# Patient Record
Sex: FEMALE | Race: WHITE | HISPANIC OR LATINO | Employment: UNEMPLOYED | URBAN - METROPOLITAN AREA
[De-identification: names, ages, dates, MRNs, and addresses within clinical notes are randomized per-mention and may not be internally consistent; named-entity substitution may affect disease eponyms.]

---

## 2018-03-19 ENCOUNTER — NON-PROVIDER VISIT (OUTPATIENT)
Dept: OBGYN | Facility: CLINIC | Age: 33
End: 2018-03-19

## 2018-03-19 DIAGNOSIS — Z32.01 POSITIVE URINE PREGNANCY TEST: ICD-10-CM

## 2018-03-19 LAB
INT CON NEG: NEGATIVE
INT CON POS: POSITIVE
POC URINE PREGNANCY TEST: POSITIVE

## 2018-03-19 PROCEDURE — 81025 URINE PREGNANCY TEST: CPT | Performed by: OBSTETRICS & GYNECOLOGY

## 2018-03-26 ENCOUNTER — APPOINTMENT (OUTPATIENT)
Dept: OBGYN | Facility: CLINIC | Age: 33
End: 2018-03-26

## 2018-04-30 ENCOUNTER — INITIAL PRENATAL (OUTPATIENT)
Dept: OBGYN | Facility: CLINIC | Age: 33
End: 2018-04-30

## 2018-04-30 ENCOUNTER — HOSPITAL ENCOUNTER (OUTPATIENT)
Facility: MEDICAL CENTER | Age: 33
End: 2018-04-30
Attending: NURSE PRACTITIONER
Payer: COMMERCIAL

## 2018-04-30 VITALS
SYSTOLIC BLOOD PRESSURE: 134 MMHG | WEIGHT: 208 LBS | HEIGHT: 61 IN | BODY MASS INDEX: 39.27 KG/M2 | DIASTOLIC BLOOD PRESSURE: 80 MMHG

## 2018-04-30 DIAGNOSIS — Z34.82 ENCOUNTER FOR SUPERVISION OF OTHER NORMAL PREGNANCY, SECOND TRIMESTER: ICD-10-CM

## 2018-04-30 LAB
APPEARANCE UR: NORMAL
BILIRUB UR STRIP-MCNC: NORMAL MG/DL
COLOR UR AUTO: NORMAL
GLUCOSE UR STRIP.AUTO-MCNC: NEGATIVE MG/DL
KETONES UR STRIP.AUTO-MCNC: NEGATIVE MG/DL
LEUKOCYTE ESTERASE UR QL STRIP.AUTO: NORMAL
NITRITE UR QL STRIP.AUTO: NEGATIVE
PH UR STRIP.AUTO: 7.5 [PH] (ref 5–8)
PROT UR QL STRIP: NEGATIVE MG/DL
RBC UR QL AUTO: NEGATIVE
SP GR UR STRIP.AUTO: 1.02
UROBILINOGEN UR STRIP-MCNC: NORMAL MG/DL

## 2018-04-30 PROCEDURE — 81002 URINALYSIS NONAUTO W/O SCOPE: CPT | Performed by: NURSE PRACTITIONER

## 2018-04-30 PROCEDURE — 59401 PR NEW OB VISIT: CPT | Performed by: NURSE PRACTITIONER

## 2018-04-30 NOTE — PROGRESS NOTES
Pt. Here for NOB visit today.  # [phone]  First prenatal care  Pt. States having nausea.   Pharmacy verified.   AFP offered but declines refusal form signed and scanned.

## 2018-04-30 NOTE — PROGRESS NOTES
Subjective:   Mary Santana is a 33 y.o.  who presents for her new OB exam.  She is 22w2d with an RUSS of Estimated Date of Delivery: 18 by LMP she is sure of but sometimes that comes twice in a month. She is feeling well and has no concerns at this time. Denies VB, LOF, contractions or pain. No ER visits or previous care in this pregnancy. Denies dysuria, vaginal DC, fever. Reports fetal movement. Declines AFP.  Declines CF.  Reports being diagnosed with high BP but then she stopped taking her med like a year ago.     Past Medical History:   Diagnosis Date   • Cholelithiasis 2010   • Head ache    • Hypertension 2015    Pt states was taking Lisinopril 10 mg but stopped taking on her own more than a year ago.        Psych Hx: Patient denies any history of depression, anxiety, PTSD, bipolar or any other psychological issues.     Past Surgical History:   Procedure Laterality Date   • DILATION AND CURETTAGE  ,    due to SAB        OB History    Para Term  AB Living   6 3 3   2 3   SAB TAB Ectopic Molar Multiple Live Births   2         3      # Outcome Date GA Lbr Akshat/2nd Weight Sex Delivery Anes PTL Lv   6 Current            5 Term 11/28/10 40w0d  3.175 kg (7 lb) F Vag-Spont None N DEVIN      Birth Comments: Pt states was induced, due to gallbladder pains.    4 Term 09 40w0d  3.374 kg (7 lb 7 oz) F Vag-Spont None  DEVIN   3 SAB 07 11w0d    SAB         Birth Comments: D&C done 1 day procedure   2 Term 05 41w0d  3.969 kg (8 lb 12 oz) M Vag-Spont None  DEVIN   1 SAB  11w0d    SAB  N       Birth Comments: d&c  done 1 day procedure , nop cmplications           Gynecological Hx: Denies any hx of STIs, including HSV. Denies any vulvovaginal disorders and no hx of abnormal cervical cytology. Last pap negatives    Sexual Hx: One current male partner, who is FOB     Contraceptive Hx: Has not used in the past and has since discontinued use.     Family History   Problem  "Relation Age of Onset   • Alcohol/Drug Father      alcohol     Denies any genetic disorders in family history.     Social History     Social History   • Marital status: Single     Spouse name: N/A   • Number of children: N/A   • Years of education: N/A     Occupational History   • Not on file.     Social History Main Topics   • Smoking status: Never Smoker   • Smokeless tobacco: Never Used   • Alcohol use No   • Drug use: No   • Sexual activity: Yes     Partners: Male      Comment: none     Other Topics Concern   • Not on file     Social History Narrative   • No narrative on file       FOB is involved and lives with Mary Santana.  Pregnancy is unplanned but desired.    She is currently working at [employer], denies any heavy lifting or exposure to potential teratogens like environmental or occupational toxins.   Denies alcohol use, drug use, or tobacco use in pregnancy.   Denies any current or hx of sexual, emotional or physical abuse or trauma.     Current Medications: PNV   Allergies: Denies allergies to medications, food, or environmental allergies    Objective:      Vitals:    04/30/18 1046   BP: 134/80   Weight: 94.3 kg (208 lb)   Height: 1.549 m (5' 1\")        Prenatal Physical wnl and Prenatal Vitals documented  UA WNL today      Assessment:      1.  IUP @ 22w2d per LMP      2.  S=D      3.  See problem list as follows     There are no active problems to display for this patient.        Plan:   -  GC/CT & pap done today   - Prenatal labs ordered - lab slip given  - Discussed PNV, nutrition, adequate water intake, and exercise/weight gain in pregnancy  - NOB informational packet with anticipatory guidance given  - Information on Centering Pregnancy given, pt not appropriate  - S/sx of pregnancy warning signs and PTL precautions given  - Complete OB US in next available wks  - Return to [facility] in 4 wks  "

## 2018-04-30 NOTE — LETTER
Estudios Para Detectar los Portadores de la Fibrosis Quística   (La Enfermedad Fibroquística del Páncreas)    Mary Santana Santana    Esta información esta relacionada con un examen de juan carlos que puede determinar si usted o rodriguez anel es portador del gen que causa la enfermedad hereditaria que se llama la fibrosis quística.     ¿QUE ES LA ENFERMEDAD FIBROSIS QUÍSTICA?  · La  fibrosis quística es kelsea enfermedad hereditaria que afecta a más de 25,000 niños y jóvenes adultos americanos.  · Los síntomas de la fibrosis quística varían de kelsea persona a otra.  Los síntomas más comunes son congestión pulmonar, diarrea y retraso en el crecimiento del tory.  La mayoría de las personas con la fibrosis quística padecen de problemas médicos muy severos, y algunas mueren jóvenes.  Otras tienen tan pocos síntomas que no se percatan de que tienen fibrosis quística.  · La fibrosis quística no afecta en absoluto la inteligencia de la persona.  · Aunque actualmente no hay kelsea lucille para la fibrosis quística, los científicos están avanzando con respecto a nuevos tratamientos y en la búsqueda de la lucille.  Anteriormente la mayoría de las personas que padecían de fibrosis quística morían muy jóvenes.  Hoy en día, muchas personas que padecen de la fibrosis quística sobreviven hasta los 20 o 30 anos de edad.    ¿EXISTE LA POSIBILIDAD DE QUE MI TIANNA PUEDA TENER FIBROSIS QUÍSTICA?  · Usted puede tener un hijo con la fibrosis quística aun cuando no hay nadie en rodriguez biju que la padezca.  (Rylie la grafica que sigue.)  · Existe kelsea prueba que puede ayudarle a determinar si warren un gen para la fibrosis quística y si por eso corre un riesgo de tener un hijo con la enfermedad.  · Si ambos padres son portadores del gen para la fibrosis quística, hay kelsea (1) probabilidad entre 4 (25%) en cada embarazo, de que puedan tener un hijo afectada con fibrosis quística.  · Los portadores del gen para la fibrosis quística tienen kelsea copia del gen  normal y el otro gen alterado.  · Las personas con fibrosis quística tienen dos genes alterados para la fibrosis quística,  · Normalmente la mayoría de individuos tienen dos copias normales del gen para fibrosis quística.    Riesgo aproximado de que kelsea anel sin familiares con fibrosis quística pueda tener un hijo con fibrosis quística:  Origen / Riesgo  Kelsea anel Caucásica (de charanjit janna):  1 en 2,500  Kelsea anel :  1 en 8,000  Kelsea anel Afroamericana (de charanjit cayetano):  1 en 15,000  Kelsea anel Asiática:  1 en 32,000    ¿EXISTEN PRUEBAS PARA DETECTAR LOS PORTADORES DE LA FIBROSIS QUÍSTICA?  · Hay kelsea prueba de juan carlos para determinar si usted o rodriguez anel portan el gen para la fibrosis quística.  · Es importante entender que la prueba no detecta todos los portadores del gen para la fibrosis quística.  · Si la prueba determina que ambos padres con portadores, se puede realizar otras pruebas para determinar si rodriguez futuro bandar esta afectado.    ¿CUANTO YOSELIN LA PRUEBA PARA DETERMINAR SI SOY PORTADOR(A) DEL GEN PARA LA FIBROSIS QUÍSTICA?  · El costo de la prueba varia según rodriguez póliza de seguro medico y el laboratorio donde se efectúa el análisis.  · El costo promedio de la prueba es de $300.  · Rodriguez consejera genética puede darle mas información acera de esta prueba para determinar si usted es portador de la fibrosis quística.    _____  Si, yo estoy interesada y me gustaria obtener mas información al respecto.  _____  No tengo interés ni en hacerme la prueba para determinar si soy portador(a) de gen para la fibrosis quística ni en recibir mas información al respecto.    Firma del paciente: _____________________________   4/30/2018

## 2018-04-30 NOTE — PATIENT INSTRUCTIONS
-  GC/CT & pap done today   - Prenatal labs ordered - lab slip given  - Discussed PNV, nutrition, adequate water intake, and exercise/weight gain in pregnancy  - NOB informational packet with anticipatory guidance given  - Information on Centering Pregnancy given, pt not appropriate  - S/sx of pregnancy warning signs and PTL precautions given  - Complete OB US in next available wks  - Return to [facility] in 4 wks

## 2018-05-03 ENCOUNTER — DATING (OUTPATIENT)
Dept: OBGYN | Facility: CLINIC | Age: 33
End: 2018-05-03

## 2018-05-03 ENCOUNTER — APPOINTMENT (OUTPATIENT)
Dept: RADIOLOGY | Facility: IMAGING CENTER | Age: 33
End: 2018-05-03
Attending: NURSE PRACTITIONER

## 2018-05-03 DIAGNOSIS — Z34.82 ENCOUNTER FOR SUPERVISION OF OTHER NORMAL PREGNANCY, SECOND TRIMESTER: ICD-10-CM

## 2018-05-03 LAB
C TRACH DNA GENITAL QL NAA+PROBE: NEGATIVE
CYTOLOGY REG CYTOL: NORMAL
N GONORRHOEA DNA GENITAL QL NAA+PROBE: NEGATIVE
SPECIMEN SOURCE: NORMAL

## 2018-05-03 PROCEDURE — 76805 OB US >/= 14 WKS SNGL FETUS: CPT | Mod: 26 | Performed by: OBSTETRICS & GYNECOLOGY

## 2018-05-08 ENCOUNTER — TELEPHONE (OUTPATIENT)
Dept: OBGYN | Facility: CLINIC | Age: 33
End: 2018-05-08

## 2018-05-08 NOTE — TELEPHONE ENCOUNTER
Notes recorded by Jacque Kemp M.D. on 5/3/2018 at 1:37 PM PDT  Ultrasound normal, probable 9 cm dermoid cyst left ovary please have patient follow-up with M.D. for consultation regarding dermoid cyst    Pt notified and re scheduled w/MD on 5/29/18 @ 1000.  Verbalized understanding.

## 2018-05-29 ENCOUNTER — ROUTINE PRENATAL (OUTPATIENT)
Dept: OBGYN | Facility: CLINIC | Age: 33
End: 2018-05-29

## 2018-05-29 ENCOUNTER — HOSPITAL ENCOUNTER (OUTPATIENT)
Dept: LAB | Facility: MEDICAL CENTER | Age: 33
End: 2018-05-29
Attending: OBSTETRICS & GYNECOLOGY
Payer: COMMERCIAL

## 2018-05-29 ENCOUNTER — HOSPITAL ENCOUNTER (OUTPATIENT)
Dept: LAB | Facility: MEDICAL CENTER | Age: 33
End: 2018-05-29
Attending: NURSE PRACTITIONER
Payer: COMMERCIAL

## 2018-05-29 VITALS — SYSTOLIC BLOOD PRESSURE: 120 MMHG | WEIGHT: 209 LBS | BODY MASS INDEX: 39.49 KG/M2 | DIASTOLIC BLOOD PRESSURE: 64 MMHG

## 2018-05-29 DIAGNOSIS — Z34.82 ENCOUNTER FOR SUPERVISION OF OTHER NORMAL PREGNANCY, SECOND TRIMESTER: ICD-10-CM

## 2018-05-29 DIAGNOSIS — N83.8 OVARIAN MASS, LEFT: ICD-10-CM

## 2018-05-29 DIAGNOSIS — O40.2XX0 POLYHYDRAMNIOS IN SECOND TRIMESTER, SINGLE OR UNSPECIFIED FETUS: ICD-10-CM

## 2018-05-29 LAB
ABO GROUP BLD: NORMAL
ALBUMIN SERPL BCP-MCNC: 3.4 G/DL (ref 3.2–4.9)
ALBUMIN/GLOB SERPL: 1 G/DL
ALP SERPL-CCNC: 75 U/L (ref 30–99)
ALT SERPL-CCNC: 8 U/L (ref 2–50)
ANION GAP SERPL CALC-SCNC: 5 MMOL/L (ref 0–11.9)
APPEARANCE UR: CLEAR
AST SERPL-CCNC: 12 U/L (ref 12–45)
BASOPHILS # BLD AUTO: 0.8 % (ref 0–1.8)
BASOPHILS # BLD: 0.08 K/UL (ref 0–0.12)
BILIRUB SERPL-MCNC: 0.3 MG/DL (ref 0.1–1.5)
BILIRUB UR QL STRIP.AUTO: NEGATIVE
BLD GP AB SCN SERPL QL: NORMAL
BUN SERPL-MCNC: 7 MG/DL (ref 8–22)
CALCIUM SERPL-MCNC: 8.6 MG/DL (ref 8.5–10.5)
CHLORIDE SERPL-SCNC: 108 MMOL/L (ref 96–112)
CO2 SERPL-SCNC: 19 MMOL/L (ref 20–33)
COLOR UR: YELLOW
CREAT SERPL-MCNC: 0.5 MG/DL (ref 0.5–1.4)
EOSINOPHIL # BLD AUTO: 0.09 K/UL (ref 0–0.51)
EOSINOPHIL NFR BLD: 0.9 % (ref 0–6.9)
ERYTHROCYTE [DISTWIDTH] IN BLOOD BY AUTOMATED COUNT: 44.1 FL (ref 35.9–50)
GLOBULIN SER CALC-MCNC: 3.4 G/DL (ref 1.9–3.5)
GLUCOSE 1H P 50 G GLC PO SERPL-MCNC: 184 MG/DL (ref 70–139)
GLUCOSE SERPL-MCNC: 97 MG/DL (ref 65–99)
GLUCOSE UR STRIP.AUTO-MCNC: NEGATIVE MG/DL
HBV SURFACE AG SER QL: NEGATIVE
HCT VFR BLD AUTO: 37.8 % (ref 37–47)
HGB BLD-MCNC: 11.9 G/DL (ref 12–16)
HIV 1+2 AB+HIV1 P24 AG SERPL QL IA: NON REACTIVE
IMM GRANULOCYTES # BLD AUTO: 0.08 K/UL (ref 0–0.11)
IMM GRANULOCYTES NFR BLD AUTO: 0.8 % (ref 0–0.9)
KETONES UR STRIP.AUTO-MCNC: NEGATIVE MG/DL
LEUKOCYTE ESTERASE UR QL STRIP.AUTO: NEGATIVE
LYMPHOCYTES # BLD AUTO: 2.15 K/UL (ref 1–4.8)
LYMPHOCYTES NFR BLD: 21.1 % (ref 22–41)
MCH RBC QN AUTO: 27.7 PG (ref 27–33)
MCHC RBC AUTO-ENTMCNC: 31.5 G/DL (ref 33.6–35)
MCV RBC AUTO: 87.9 FL (ref 81.4–97.8)
MICRO URNS: ABNORMAL
MONOCYTES # BLD AUTO: 0.53 K/UL (ref 0–0.85)
MONOCYTES NFR BLD AUTO: 5.2 % (ref 0–13.4)
NEUTROPHILS # BLD AUTO: 7.28 K/UL (ref 2–7.15)
NEUTROPHILS NFR BLD: 71.2 % (ref 44–72)
NITRITE UR QL STRIP.AUTO: NEGATIVE
NRBC # BLD AUTO: 0 K/UL
NRBC BLD-RTO: 0 /100 WBC
PH UR STRIP.AUTO: 6.5 [PH]
PLATELET # BLD AUTO: 241 K/UL (ref 164–446)
PMV BLD AUTO: 10.6 FL (ref 9–12.9)
POTASSIUM SERPL-SCNC: 3.7 MMOL/L (ref 3.6–5.5)
PROT SERPL-MCNC: 6.8 G/DL (ref 6–8.2)
PROT UR QL STRIP: NEGATIVE MG/DL
RBC # BLD AUTO: 4.3 M/UL (ref 4.2–5.4)
RBC UR QL AUTO: NEGATIVE
RH BLD: NORMAL
RUBV AB SER QL: >500 IU/ML
SODIUM SERPL-SCNC: 132 MMOL/L (ref 135–145)
SP GR UR STRIP.AUTO: 1.02
TREPONEMA PALLIDUM IGG+IGM AB [PRESENCE] IN SERUM OR PLASMA BY IMMUNOASSAY: NON REACTIVE
UROBILINOGEN UR STRIP.AUTO-MCNC: 0.2 MG/DL
WBC # BLD AUTO: 10.2 K/UL (ref 4.8–10.8)

## 2018-05-29 PROCEDURE — 90040 PR PRENATAL FOLLOW UP: CPT | Performed by: OBSTETRICS & GYNECOLOGY

## 2018-05-29 NOTE — PROGRESS NOTES
Pt here today for OB follow up  Reports +FM  WT: 209 lb   BP: 120/64  PNP labs not done yet.pt states she will get her blood work done today, pt also informed that she also needs to get her 1 hr gtt labs done. instructions given.   Pt states she is having upper right upper stomach pain. States no other concerns.  Good # [phone]

## 2018-05-29 NOTE — PROGRESS NOTES
S: Pt presents for routine OB follow up.  No contractions, vaginal bleeding, or leaking fluids. Good fetal movement.  Pt has intermittent RUQ pain after eating, hx of gallbladder issues in past, has not had cholecystectomy    Ultrasound findings reviewed with patient, 9cm complex left adnexal mass seen, pt desires BTL. Due to the size of the mass and that pt desires BTL, recommend delivery by primary . We can remove the cyst at time of , pt aware she may have cystectomy or may have complete left oophrectomy depending on the nature of the mass at time of .    Questions answered.    O: /64   Wt 94.8 kg (209 lb)   LMP 2017   BMI 39.49 kg/m²   Patients' weight gain, fluid intake and exercise level discussed.  Vitals, fundal height , fetal position, and FHR reviewed on flowsheet    Lab:No results found for this or any previous visit (from the past 336 hour(s)).    A/P:  33 y.o.  at 26w3d presents for routine obstetric follow-up.  Size equals dates and/or scan  9cm complex left adnexal mass - probable dermoid --> for primary  w/ BTL and left ovarian cyst removal/possible oophrectomy  Desires BTL  Mild polyhydramnios on last u/s    1.  Continue prenatal vitamins.  2.  Begin kick counts at 28 weeks.  3.  Exercise at least 30 minutes daily.  4.  Increase water intake.  5.  Follow-up in 4 weeks.  6.  Check CMP  7.  Do prenatal labs and 1hr GTT  8.  Low fat diet to prevent gallbladder pain  9.  Sign tubal papers and  consent next appt  10. Repeat u/s for growth/ELOY and recheck adnexal mass ordered for 4wks

## 2018-05-30 ENCOUNTER — TELEPHONE (OUTPATIENT)
Dept: OBGYN | Facility: CLINIC | Age: 33
End: 2018-05-30

## 2018-05-30 DIAGNOSIS — R73.09 ELEVATED GLUCOSE TOLERANCE TEST: ICD-10-CM

## 2018-05-30 NOTE — TELEPHONE ENCOUNTER
Notes recorded by Cori Luna C.N.M. on 5/29/2018 at 8:00 PM PDT  Please let patient know these results, and have her complete 3 hr GTT ASAP    Unable to contact pt msg left to call back    Pt returned called, Pt notified of abnormal 1hr gtt and need to do 3hr gtt this time. Pt instructed to fast 10-12 hrs  pt only allow to drink plain water during fasting time. Pt will call lab to schedule an appt. Advised to bring a snack for after the test is done. Pt notified will be staying in the labs for the 3hr. Pt agreed to do it 5/31/18. Pt verbalized understanding.

## 2018-05-31 ENCOUNTER — HOSPITAL ENCOUNTER (OUTPATIENT)
Dept: LAB | Facility: MEDICAL CENTER | Age: 33
End: 2018-05-31
Attending: NURSE PRACTITIONER
Payer: COMMERCIAL

## 2018-05-31 DIAGNOSIS — R73.09 ELEVATED GLUCOSE TOLERANCE TEST: ICD-10-CM

## 2018-05-31 LAB
GLUCOSE 1H P CHAL SERPL-MCNC: 189 MG/DL (ref 65–180)
GLUCOSE 2H P CHAL SERPL-MCNC: 141 MG/DL (ref 65–155)
GLUCOSE 3H P CHAL SERPL-MCNC: 82 MG/DL (ref 65–140)
GLUCOSE BS SERPL-MCNC: 109 MG/DL (ref 65–95)

## 2018-06-02 PROBLEM — O24.913 DIABETES MELLITUS AFFECTING PREGNANCY IN THIRD TRIMESTER: Status: ACTIVE | Noted: 2018-06-02

## 2018-06-26 ENCOUNTER — ROUTINE PRENATAL (OUTPATIENT)
Dept: OBGYN | Facility: CLINIC | Age: 33
End: 2018-06-26

## 2018-06-26 VITALS — BODY MASS INDEX: 39.68 KG/M2 | DIASTOLIC BLOOD PRESSURE: 58 MMHG | SYSTOLIC BLOOD PRESSURE: 118 MMHG | WEIGHT: 210 LBS

## 2018-06-26 DIAGNOSIS — O24.913 DIABETES MELLITUS AFFECTING PREGNANCY IN THIRD TRIMESTER: ICD-10-CM

## 2018-06-26 DIAGNOSIS — N83.8 OVARIAN MASS, LEFT: ICD-10-CM

## 2018-06-26 DIAGNOSIS — Z34.82 ENCOUNTER FOR SUPERVISION OF OTHER NORMAL PREGNANCY, SECOND TRIMESTER: ICD-10-CM

## 2018-06-26 PROCEDURE — 90040 PR PRENATAL FOLLOW UP: CPT | Performed by: NURSE PRACTITIONER

## 2018-06-26 NOTE — PROGRESS NOTES
Pt here today for OB follow up  Reports +FM  WT: 210 lb  BP: 118/58  Pt states she felt couple strong contractions last night. States she is having strong gallbladder pain. Pt also reports has been vomiting a lot for the past 3 weeks.   ADOLFO sheet given and explained today   Desires BTL. Consent signed today  Pt Unsure of getting Tdap vaccine today, not feeling well today   Pt is aware she didn't passed her 3 hr gtt, and needs be seen with MD on Thursdays upon completing a Diabetic nutrition class which has been scheduled for Tuesday 07/03/18 at 8:30 am at [facility]. Pt informed.  Good # [phone]

## 2018-06-26 NOTE — LETTER
Cuente los Movimientos de rodriguez Bebé  Otro paso importante para la anmol de rodriguez bebé    Mary Santana Santana     [facility]            Dept: [phone]    ¿Cuántas semanas tiene de embarazo? 30w3d    Fecha cuando tiene que comenzar a contar el movimiento: 30W3D                  Davis debe usar louis diagrama    Kelsea manera en que rodriguez doctor puede controlar a anmol de rodriguez bebé es sabiendo cuantas veces se mueve rodriguez bebé en el útero, o por medio de las “pataditas”.  Usted podrá ayudarle a rodriguez médico al usar cada día el siguiente diagrama.    Cada día, usted debe prestar atención a cuantas horas le lleva a rodriguez bebé moverse 10 veces.  Comience a contar en la mañana, lo antes posible después de haberse levantado.    · Primeramente, escriba la hora en que se mueve rodirguez bebé, hasta llegar a 10 veces.  · Colóquele un check o palomita a cada cuadrito cada vez que rodriguez bebé se mueva hasta que complete 10 veces.  · Escriba la hora cuando termine de contar 10 veces en la última columna.  · Sume el total del tiempo que le llevó contar los 10 movimientos.  · Finalmente, complete el cuadrito de cuantas horas le llevó hacerlo.    Después de natanael contado los 10 movimientos, ya no tendrá que contar los demás movimientos por el christianne del día.  A la mañana siguiente, comience a contar de nuevo cuantas veces se mueve el bebé desde el momento en que se levante.    ¿Qué tendría que considerarse un “movimiento”?  Es difícil de decirlo porque es distinto de kelsea madre a otra, y de un embarazo a otro.  Lo importante es que cuente el movimiento de la misma manera kaden el transcurso de rodriguez embarazo.  Si tiene preguntas adicionales, pregúntele a rodriguez doctor.    ¡Cuente cuidadosamente cada día!     MUESTRA:  Semana 28    ¿Cuántas horas le ha llevado sentir 10 movimientos?        Hora de Inicio     1     2     3     4     5     6     7     8     9     10   Hora de Finlizar   Jeanne. 8:20 ·  ·  ·  ·  ·  ·  ·  ·  ·  ·  11:40   Mar.               Mié.                Jue.               Vie.               Sáb.               Dom.                 IMPORTANTE:  Usted debe contactar a rodriguez doctor si le lleva más de 2 horas sentir 10 movimientos de rodriguez bebé.    Cada mañana, escriba la hora de inicio y comience a contar los movimientos de rodriguez bebé.  Hágalo colocándole un check o palomita a cada cuadrito cada vez que sienta un movimiento de rodriguez bebé.  Cuando haya sentido 10 “pataditas”, escriba la hora en que terminó de contar en la última columna.  Luego, complete en la cajita (arriba de la chung de check o palomita) el número total de horas que le llevó hacerlo.  Asegúrese de leer completamente las instrucciones en la página anterior.

## 2018-06-26 NOTE — PROGRESS NOTES
S:  Pt is  at 30w3d for routine OB follow up.  Reports cold sx today. Also is having gallbladder issues with n/v for past 3 weeks. Patient has US scheduled to recheck lt adnexal mass.  Reports good FM.  Denies VB, LOF, RUCs or vaginal DC.    O:  Please see above vitals.        FHTs: 143        Fundal ht: 31 cm.        S=D        3hr GTT: abnorml-GDM-- reviewed w pt.            A:  IUP at 30w3d  Patient Active Problem List    Diagnosis Date Noted   • Diabetes mellitus affecting pregnancy in third trimester 2018   • Ovarian mass, left 2018   • Encounter for supervision of other normal pregnancy, second trimester 2018        P:  1.  PP contraception: desires BTL.        2.  Continue FKCs.          3.  Questions answered.          4.  Encouraged pt to tour L&D.          5.  Encourage adequate water intake.        6.  F/u with diabetic class on physician on diabetic clinic day.        7.  Tdap deferred to next ivist due to cold today.          8.  Given information and handout on gallbladder, if problem persists, follow up at hospital.         9. Given information and handout for cold remedies       10. Follow up US on  to recheck lt adnexal mass.

## 2018-06-29 ENCOUNTER — APPOINTMENT (OUTPATIENT)
Dept: RADIOLOGY | Facility: IMAGING CENTER | Age: 33
End: 2018-06-29
Attending: OBSTETRICS & GYNECOLOGY

## 2018-06-29 DIAGNOSIS — Z34.82 ENCOUNTER FOR SUPERVISION OF OTHER NORMAL PREGNANCY, SECOND TRIMESTER: ICD-10-CM

## 2018-06-29 DIAGNOSIS — O40.2XX0 POLYHYDRAMNIOS IN SECOND TRIMESTER, SINGLE OR UNSPECIFIED FETUS: ICD-10-CM

## 2018-06-29 DIAGNOSIS — N83.8 OVARIAN MASS, LEFT: ICD-10-CM

## 2018-06-29 PROCEDURE — 76816 OB US FOLLOW-UP PER FETUS: CPT | Mod: 26 | Performed by: OBSTETRICS & GYNECOLOGY

## 2018-07-02 ENCOUNTER — DATING (OUTPATIENT)
Dept: OBGYN | Facility: MEDICAL CENTER | Age: 33
End: 2018-07-02

## 2018-07-02 DIAGNOSIS — N83.8 OVARIAN MASS, LEFT: ICD-10-CM

## 2018-07-03 ENCOUNTER — NON-PROVIDER VISIT (OUTPATIENT)
Dept: HEALTH INFORMATION MANAGEMENT | Facility: MEDICAL CENTER | Age: 33
End: 2018-07-03

## 2018-07-03 VITALS
SYSTOLIC BLOOD PRESSURE: 119 MMHG | DIASTOLIC BLOOD PRESSURE: 60 MMHG | HEART RATE: 94 BPM | HEIGHT: 61 IN | WEIGHT: 210 LBS | BODY MASS INDEX: 39.65 KG/M2

## 2018-07-03 DIAGNOSIS — O24.419 GESTATIONAL DIABETES MELLITUS (GDM) IN THIRD TRIMESTER, GESTATIONAL DIABETES METHOD OF CONTROL UNSPECIFIED: ICD-10-CM

## 2018-07-03 PROCEDURE — G0109 DIAB MANAGE TRN IND/GROUP: HCPCS | Performed by: INTERNAL MEDICINE

## 2018-07-03 NOTE — LETTER
July 3, 2018                   Re: Mary Santana     1985         [id number]       [facility]  [address] [place] [address]  [place] [id number]      Dear :[facility], *    On 7/3/2018, your patient Mary Santana, received 1 hour of diabetes education from the Diabetes Center at [facility] for management of her gestational diabetes.  Her EDC is : 9/1/18.  We taught the following subjects:    Introduction to gestational diabetes, benefits and responsibilities of patient, physiology of diabetes and the diease process, benefits of blood glucose monitoring and record keeping, medication action and possible side effects, hypoglycemia, sick day management, exercise, stress reduction and travel with diabetes.       Nurse assessment / Education:    Comments:    BP:Blood Pressure: 119/60   Edema:No     Weight:Weight: 95.3 kg (210 lb)         Complaints:No     Pathophysiology of diabetes in pregnancy    Discuss  potential maternal and fetal complications in pregnancy with diabetes.     Importance of blood glucose monitoring   Proper testing technique using a One Touch Verio meter.    At 0940, the meter read 118, which was 13 hours pp.  Testing: fasting and one hour after meals,  expected ranges and rationale for strict control.   Urine ketone testing and rationale    Ketone testing: At the [facility]    Ketone test today:No     Recognition and treatment of hypoglycemia.     Insulin taught: No  Insulin briefly dicussed at this time.    Should patient require insulin later in pregnancy, she would need further education.     Reviewed fetal kick counts and other tests to determine fetal well-being  Discuss benefits and risks of exercise in pregnancy  Discuss when to call Doctor  Discuss sick day care  Importance of wearing diabetes identification    Mary attended [place] Gestational Diabetes class with her children. Pt was given a One touch verio meter with 10 strips and taught to use.  "Return demonstration was done with finger stick of 118, 13 hours pp. Less costly options for strips and meters discussed, which included CARE CHEST( she would need a prescription for this), and [facility]'s Reli-On meter($9.00) and strips ($9.00 for 50 strips). Accucheck Guide with coupon ( would need prescription ) was also discussed. Pt states she is not active at work, but walks 15 \" everyday. Discussed what she needs to do after delivery for herself and family to limit risk for type two diabetes. All education and handouts given in [place].    Patient/caregiver appeared to understand the content as demonstrated by appropriate questions.     Mary Santana was encouraged to discuss this further with you.    Hopefully this will help in your management of her care.  If we can be of further assistance, please feel free to call.    Thank you for the referral.    Sincerely,    Yadi Bruner RN CDE  Certified Diabetes Educator  "

## 2018-07-03 NOTE — PROGRESS NOTES
"Mary attended [place] Gestational Diabetes class with her children. Pt was given a One touch verio meter with 10 strips and taught to use. Return demonstration was done with finger stick of 118, 13 hours pp. Less costly options for strips and meters discussed, which included CARE CHEST( she would need a prescription for this), and [facility]'s Reli-On meter($9.00) and strips ($9.00 for 50 strips). Accucheck Guide with coupon ( would need prescription ) was also discussed. Pt states she is not active at work, but walks 15 \" everyday. Discussed what she needs to do after delivery for herself and family to limit risk for type two diabetes. All education and handouts given in [place].  "

## 2018-07-03 NOTE — LETTER
July 3, 2018                   Re: Mary Santana    1985         [id number]       [facility]  [address]  [place] [address]      Dear :[facility]:    On 7/3/2018, your patient Mary Santana, received 2 hours of nutrition training from the Diabetes Center at [facility] for management of her gestational diabetes.  Her EDC is Estimated Date of Delivery: 9/1/18.  We taught the following subjects:    Patient provided 1900 calorie meal plan with 3 meals and 3 snacks.   187 grams carbohydrate,   113 grams protein,   75 grams fat  Importance of meal planning in diabetes management during pregnancy  Importance of consistent timing of meals and snacks and agreed upon times  Avoidance of simple carbohydrates  Metabolism of food components relating to pregnancy  Identification of foods in food groups  Patient demonstrates adequate ability to utilize meal planning manual for reference  Plan 3 meals and 3 snacks with 90% accuracy  Review basic principles of eating out  Reviewed precautions with artificial sweeteners  Comments:  Mary agreed to follow the meal plan and to eat at the times agreed upon.  She will check blood glucose 4 times a day and record the values in her log book.  She will follow up at [facility] on 7/5/18.    Hopefully this will help in your management of her care.  If we can be of further assistance, please feel free to call.    Thank you for the referral.    Sincerely,  Bernadine Madsen RD, CDE  Certified Diabetes Educator

## 2018-07-05 ENCOUNTER — HOSPITAL ENCOUNTER (OUTPATIENT)
Dept: LAB | Facility: MEDICAL CENTER | Age: 33
End: 2018-07-05
Attending: OBSTETRICS & GYNECOLOGY
Payer: COMMERCIAL

## 2018-07-05 ENCOUNTER — ROUTINE PRENATAL (OUTPATIENT)
Dept: OBGYN | Facility: CLINIC | Age: 33
End: 2018-07-05

## 2018-07-05 ENCOUNTER — NON-PROVIDER VISIT (OUTPATIENT)
Dept: OBGYN | Facility: CLINIC | Age: 33
End: 2018-07-05

## 2018-07-05 VITALS — WEIGHT: 211 LBS | DIASTOLIC BLOOD PRESSURE: 62 MMHG | BODY MASS INDEX: 39.87 KG/M2 | SYSTOLIC BLOOD PRESSURE: 118 MMHG

## 2018-07-05 DIAGNOSIS — O24.913 DIABETES MELLITUS AFFECTING PREGNANCY IN THIRD TRIMESTER: ICD-10-CM

## 2018-07-05 DIAGNOSIS — N83.8 OVARIAN MASS, LEFT: ICD-10-CM

## 2018-07-05 DIAGNOSIS — Z34.82 ENCOUNTER FOR SUPERVISION OF OTHER NORMAL PREGNANCY, SECOND TRIMESTER: ICD-10-CM

## 2018-07-05 DIAGNOSIS — O40.3XX0 POLYHYDRAMNIOS IN THIRD TRIMESTER COMPLICATION, SINGLE OR UNSPECIFIED FETUS: ICD-10-CM

## 2018-07-05 LAB
EST. AVERAGE GLUCOSE BLD GHB EST-MCNC: 134 MG/DL
GLUCOSE BLD-MCNC: 110 MG/DL (ref 70–100)
HBA1C MFR BLD: 6.3 % (ref 0–5.6)

## 2018-07-05 PROCEDURE — 97802 MEDICAL NUTRITION INDIV IN: CPT | Performed by: DIETITIAN, REGISTERED

## 2018-07-05 PROCEDURE — 82962 GLUCOSE BLOOD TEST: CPT | Performed by: OBSTETRICS & GYNECOLOGY

## 2018-07-05 PROCEDURE — 90040 PR PRENATAL FOLLOW UP: CPT | Performed by: OBSTETRICS & GYNECOLOGY

## 2018-07-05 NOTE — PROGRESS NOTES
" Subjective:  -Pt states she has stopped drinking soda and is no longer eating cereal in the morning.   -She is eating 3 meals a day. Has not been having her snacks during the day.   -Drinks milk 4oz in the morning and 4oz for bedtime snack   -Has been trying to eat more vegetables, however, is having some heartburn after eating tomatoes for example   -Is not sure why her blood sugars are still high despite making these dietary changes.     Diet hx:   B- 1 slice wheat bread with 2 eggs w/ 4oz milk   S- none  L- 2 slices wheat bread with ham and cheese w/ water    S- none   D- varies. Usually traditional Mexican food   S- 4oz milk     Objective:   Anthropometrics:  Today's weight: 211lb   Height: 5'1\"  Pre-pregnancy weight: 203lb  Total weight gain: 8lb  Weeks gestation: 31w5d    Biochemical data, medical test and procedures:  No results found for: HBA1C@  Lab Results   Component Value Date/Time    POCGLUCOSE 110 (A) 07/05/2018 08:20 AM     OGTT Results: 109, 189, 141, 82   Glucose Log Book (most recent):    Fasting glucose range: 118-132   1 hour glucose range: 110-191    Assessment:   Nutrition Diagnosis (PES Statement):  · Altered nutrition related lab values related to endocrine dysfunction as evidenced by OGTT     Recommended Diet:  1900 calorie meal plan (see media for detailed meal plan)  3 meals and 3 snacks   1 carb choice at breakfast w/ 2oz protein and 1 fat   No concentrated sweets for remainder of pregnancy   No fruit in the morning   No sweetened beverages  No alcohol in pregnancy   Do not go exceed 4 hours during day or >10 hours overnight without eating     Assessment Notes:   All of patient’s blood sugars are elevated the past 3 days. She has not been eating snacks in between her meals. Explained that it is important to eat every 3 hours during the day for better blood sugar control. Also she may need to add more protein to her bedtime snack to help with fasting levels.   Would like to follow up with " pt next visit to see if she is following the meal plan correctly. She appears motivated to comply but still may have some misunderstandings.     Plan: Monitoring & Evaluation  Goals:  1. Follow meal plan as outlined above; 3 meals and 3 snacks daily.   2. Check FSBS 4 times a day   3. FSBS Goals= Fasting <90; 1 hour PP <130   4. Bring blood sugar log book to each appointment   5. Appropriate weight gain during pregnancy       Follow up BOBBY Jimenes, RD, LD, CDE  [phone]

## 2018-07-05 NOTE — PROGRESS NOTES
Mary Santana 33 y.o.  31w5d here today for obstetrical visit. Patient reports good  fetal movement. denies contractions, denies vaginal bleeding, denies loss of fluid.    Pregnancy is complicated by   Patient Active Problem List    Diagnosis Date Noted   • Diabetes mellitus affecting pregnancy in third trimester 2018   • Ovarian mass, left 2018   • Encounter for supervision of other normal pregnancy, second trimester 2018       GBS not done  Fasting blood sugars range >90  Postprandial blood sugars range >140    New GDM  Will have diet counseling again this am  Possible insulin next week    Insulin regimen  NPH a.m. 0 , regular a.m.0  Regular with dinner 0  NPH at bedtime 0     Followup in 1 weeks   labor precautions are reviewed  Continue kick counts daily after 28 weeks  NST twice weekly after 32 weeks if on insulin

## 2018-07-05 NOTE — PROGRESS NOTES
New GDM/OB F/U.  + fetal movement  Denies any VB, LO or UC's  Phone # [phone]  Pharmacy confirmed  Diabetic supplies: None needed at this time. Patient was given information for the [employer] machine.   AccuCheck: 110, last meal was about two hours ago.

## 2018-07-09 ENCOUNTER — ROUTINE PRENATAL (OUTPATIENT)
Dept: OBGYN | Facility: CLINIC | Age: 33
End: 2018-07-09

## 2018-07-09 DIAGNOSIS — O40.3XX0 POLYHYDRAMNIOS IN THIRD TRIMESTER COMPLICATION, SINGLE OR UNSPECIFIED FETUS: ICD-10-CM

## 2018-07-09 LAB
NST ACOUSTIC STIMULATION: NO
NST ACTION NECESSARY: NORMAL
NST ASSESSMENT: REACTIVE
NST BASELINE: 130
NST INDICATIONS: NORMAL
NST OTHER DATA: NORMAL
NST READ BY: NORMAL
NST RETURN: NORMAL
NST UTERINE ACTIVITY: NO

## 2018-07-09 PROCEDURE — 59025 FETAL NON-STRESS TEST: CPT | Performed by: OBSTETRICS & GYNECOLOGY

## 2018-07-12 ENCOUNTER — NON-PROVIDER VISIT (OUTPATIENT)
Dept: OBGYN | Facility: CLINIC | Age: 33
End: 2018-07-12

## 2018-07-12 ENCOUNTER — ROUTINE PRENATAL (OUTPATIENT)
Dept: OBGYN | Facility: CLINIC | Age: 33
End: 2018-07-12

## 2018-07-12 VITALS — SYSTOLIC BLOOD PRESSURE: 128 MMHG | BODY MASS INDEX: 39.87 KG/M2 | DIASTOLIC BLOOD PRESSURE: 55 MMHG | WEIGHT: 211 LBS

## 2018-07-12 DIAGNOSIS — N83.8 OVARIAN MASS, LEFT: ICD-10-CM

## 2018-07-12 DIAGNOSIS — O24.913 DIABETES MELLITUS AFFECTING PREGNANCY IN THIRD TRIMESTER: ICD-10-CM

## 2018-07-12 DIAGNOSIS — O40.3XX1 POLYHYDRAMNIOS IN THIRD TRIMESTER COMPLICATION, FETUS 1 OF MULTIPLE GESTATION: ICD-10-CM

## 2018-07-12 DIAGNOSIS — Z34.82 ENCOUNTER FOR SUPERVISION OF OTHER NORMAL PREGNANCY, SECOND TRIMESTER: ICD-10-CM

## 2018-07-12 DIAGNOSIS — O40.3XX0 POLYHYDRAMNIOS IN THIRD TRIMESTER COMPLICATION, SINGLE OR UNSPECIFIED FETUS: ICD-10-CM

## 2018-07-12 LAB
GLUCOSE BLD-MCNC: 184 MG/DL (ref 70–100)
NST ACOUSTIC STIMULATION: NORMAL
NST ACTION NECESSARY: NORMAL
NST ASSESSMENT: REACTIVE
NST BASELINE: 140
NST INDICATIONS: NORMAL
NST OTHER DATA: NORMAL
NST READ BY: NORMAL
NST RETURN: NORMAL
NST UTERINE ACTIVITY: NORMAL

## 2018-07-12 PROCEDURE — 59025 FETAL NON-STRESS TEST: CPT | Performed by: OBSTETRICS & GYNECOLOGY

## 2018-07-12 PROCEDURE — 97803 MED NUTRITION INDIV SUBSEQ: CPT | Performed by: DIETITIAN, REGISTERED

## 2018-07-12 PROCEDURE — 90040 PR PRENATAL FOLLOW UP: CPT | Performed by: OBSTETRICS & GYNECOLOGY

## 2018-07-12 NOTE — NON-PROVIDER
Insulin instructions given to patient on 07/12/2018. Patient will start 5 units of NPH, QHS. Patient instructed how to draw up insulin, site selection and rotation, self injection technique, proper storage of insulin and disposal of syringes. Patient demonstrated back self injection technique successfully. Advised to follow really close her meal plan. Will call back in case of questions or problems.

## 2018-07-12 NOTE — PROGRESS NOTES
Diabetic prenatal visit;    32w5d  Patient Active Problem List    Diagnosis Date Noted   • Polyhydramnios in third trimester 2018   • Diabetes mellitus affecting pregnancy in third trimester 2018   • Ovarian mass, left 2018   • Encounter for supervision of other normal pregnancy, second trimester 2018       The patient presents for prenatal care. She is doing well. Denies contractions leakage of fluid or vaginal bleeding. Having good fetal movement    Vitals:    18 1046   BP: 128/55   Weight: 95.7 kg (211 lb)       Hemoglobin A1C; 6.3    Fasting sugars;   One hour Postprandial sugars;     Size equals dates, normal fetal heart rate      Continue ADA diet    Will start insulin today-we will received insulin teaching  Prescription sent to pharmacy for insulin NPH and insulin syringes    New insulin regimen;   Morning; 0  Dinnertime; 0  Bedtime; 5 units NPH    NSTs twice weekly starting at 32 weeks if class AII GDM or worse  Increase fluid hydration to 2 L per day  Discussed proper shoes to be worn her pregnancy  Increase exercise daily walking 30 minutes per day  Delivery by 39 weeks if class AII GDM or worse  Discussed support hose use during pregnancy    Followup; 1 weeks    Surgical scheduling has notified me today that patient needs to provide $2-$3000 if she will have  section with resection of left ovarian dermoid.  The patient states she cannot afford this and thus we will proceed with vaginal delivery.  The patient will need to have resection of dermoid after delivery

## 2018-07-12 NOTE — PROGRESS NOTES
GDM Class A1. OB F/U.  + fetal movement  Denies any VB or LOF  Phone #[phone]  Pharmacy confirmed  Diabetic supplies: none needed today   C/O: irregular UCs  , post 3 hrs breakfast

## 2018-07-12 NOTE — PROGRESS NOTES
"Subjective:  -Pt states she is eating 3 meals and 2-3 snacks. Sometimes skips AM snack   -Is not eating fruit in the morning. Has 1 fruit serving a day for PM snack   -Is drinking only water. No more soda.  -Had 2 low blood sugars in 30's on 2 occasions.      Diet hx:   B- light yogurt with 1 toast and water   S- none   L- lentils with veggies and sometimes adds meat   S- small fruit and/or veggies (sometimes with 3 almonds)   D- meat/protein with veggies and some starch (rice or lentils or tortillas)   S- 1 cup milk     Objective:   Anthropometrics:  Today's Weight: 211lb    Height: 5'1\"  Pre-pregnancy weight: 203lb  Total weight gain: +8  Weeks gestation: 32w5d    Biochemical data, medical test and procedures:  Lab Results   Component Value Date/Time    HBA1C 6.3 (H) 07/05/2018 09:07 AM   @  Lab Results   Component Value Date/Time    POCGLUCOSE 184 (A) 07/12/2018 10:54 AM     Glucose Log Book (most recent):    Fasting glucose range: 116-132 (all are >90)    1 hour glucose range:  (8 out of 20 are >140)     Assessment:   Recommended Diet:  1900 calorie meal plan (see media for detailed meal plan)  3 meals and 3 snacks   1 carb choice at breakfast w/ 2oz protein and 1 fat   No concentrated sweets for remainder of pregnancy   No fruit in the morning   No sweetened beverages  No alcohol in pregnancy   Do not go exceed 4 hours during day or >10 hours overnight without eating     Assessment Notes:   Mary is eating too much carbohydrate at breakfast, not enough protein at lunch, and has been skipping snacks. Her blood sugars are elevated and was started on NPH 5 units q HS today.   She was informed to choose light yogurt OR 1 toast for breakfast (with protein) not both. Also to include more protein at her meals and snacks. Is having only 3 nuts at a snack and was told to have most for protein at a snack). We also reviewed HS snack. She needs to be having more at night, and less in the mornings, overall.   Will " follow up next week so get a detailed diet hx for further dietary suggestions.     Plan: Monitoring & Evaluation  Goals:  1. Follow meal plan as outlined above; 3 meals and 3 snacks daily.   2. Check FSBS 4 times a day   3. FSBS Goals= Fasting <90; 1 hour PP <130   4. Bring blood sugar log book to each appointment   5. Appropriate weight gain during pregnancy       Follow up 1 weeks   Tata Jimenes, RD, LD, CDE  [phone]

## 2018-07-16 ENCOUNTER — ROUTINE PRENATAL (OUTPATIENT)
Dept: OBGYN | Facility: CLINIC | Age: 33
End: 2018-07-16

## 2018-07-16 DIAGNOSIS — O40.3XX0 POLYHYDRAMNIOS IN THIRD TRIMESTER COMPLICATION, SINGLE OR UNSPECIFIED FETUS: ICD-10-CM

## 2018-07-16 LAB
NST ACOUSTIC STIMULATION: NORMAL
NST ACTION NECESSARY: NORMAL
NST ASSESSMENT: NORMAL
NST BASELINE: 130
NST INDICATIONS: NORMAL
NST OTHER DATA: NORMAL
NST READ BY: NORMAL
NST RETURN: NORMAL
NST UTERINE ACTIVITY: NORMAL

## 2018-07-16 PROCEDURE — 59025 FETAL NON-STRESS TEST: CPT | Performed by: OBSTETRICS & GYNECOLOGY

## 2018-07-19 ENCOUNTER — ROUTINE PRENATAL (OUTPATIENT)
Dept: OBGYN | Facility: CLINIC | Age: 33
End: 2018-07-19

## 2018-07-19 ENCOUNTER — NON-PROVIDER VISIT (OUTPATIENT)
Dept: OBGYN | Facility: CLINIC | Age: 33
End: 2018-07-19

## 2018-07-19 VITALS — WEIGHT: 213 LBS | SYSTOLIC BLOOD PRESSURE: 117 MMHG | DIASTOLIC BLOOD PRESSURE: 61 MMHG | BODY MASS INDEX: 40.25 KG/M2

## 2018-07-19 DIAGNOSIS — O40.3XX0 POLYHYDRAMNIOS IN THIRD TRIMESTER COMPLICATION, SINGLE OR UNSPECIFIED FETUS: ICD-10-CM

## 2018-07-19 DIAGNOSIS — O24.913 DIABETES MELLITUS AFFECTING PREGNANCY IN THIRD TRIMESTER: ICD-10-CM

## 2018-07-19 DIAGNOSIS — N83.8 OVARIAN MASS, LEFT: ICD-10-CM

## 2018-07-19 DIAGNOSIS — O24.414 INSULIN CONTROLLED GESTATIONAL DIABETES MELLITUS (GDM) IN THIRD TRIMESTER: ICD-10-CM

## 2018-07-19 DIAGNOSIS — Z34.82 ENCOUNTER FOR SUPERVISION OF OTHER NORMAL PREGNANCY, SECOND TRIMESTER: ICD-10-CM

## 2018-07-19 LAB
GLUCOSE BLD-MCNC: 166 MG/DL (ref 70–100)
NST ACOUSTIC STIMULATION: NORMAL
NST ACTION NECESSARY: NORMAL
NST ASSESSMENT: REACTIVE
NST BASELINE: 140
NST INDICATIONS: NORMAL
NST OTHER DATA: NORMAL
NST READ BY: NORMAL
NST RETURN: NORMAL
NST UTERINE ACTIVITY: NORMAL

## 2018-07-19 PROCEDURE — 90040 PR PRENATAL FOLLOW UP: CPT | Performed by: OBSTETRICS & GYNECOLOGY

## 2018-07-19 PROCEDURE — 59025 FETAL NON-STRESS TEST: CPT | Performed by: OBSTETRICS & GYNECOLOGY

## 2018-07-19 PROCEDURE — 82962 GLUCOSE BLOOD TEST: CPT | Performed by: OBSTETRICS & GYNECOLOGY

## 2018-07-19 NOTE — PROGRESS NOTES
S: Pt here for OB follow up. Doing well.   positive fetal movement.    negative vaginal bleeding.    negative leakage of fluid.    Occas contractions.    Reviewed blood sugar log with patient.  Reviewed diet.  Questions answered.    O: VSS Afeb      See prenatal vitals, chart for today's exam    FBS range: , all > 90 except 1  1hr post prandial range:     Insulin regimen  NPH at bedtime 5 units       A/P:  33 y.o.  33w5d with A2 GDM who presents for obstetric follow-up.  Left adnexal mass - unable to afford primary  w/ BTL so will need to remove postpartum    1.  Labor precautions and fetal kick counts educated  2.  Change insulin dosage as follows:          AM: 5 units NPH, QHS: 10 units NPH  3.  NSTs: 2x/week  4.  Continue prenatal vitamins.  5.  Watch weight gain.  Exercise at least 30 minutes daily  6.  Increase water intake.  7.  Follow-up in 1 weeks for obstetric follow-up.   8.  IOL placed for 39 weeks

## 2018-07-19 NOTE — PROGRESS NOTES
Pt was not able to meet with me today. Her 2hr FSBS today was 166 and has multiple elevated fasting and 1 hr glucose levels documented in her log book. Dr. West increased her insulin from NPH 5 units HS to NPH 5 units in AM, and NPH 10 units HS.   Would like to see this patient next week to follow up on her diet/ meal plan. She was previously skipping snacks and not eating enough protein when we last met.   Tata Jimenes, RD, LD, CDE  [phone]

## 2018-07-19 NOTE — PROGRESS NOTES
GDM Class A2. OB F/U.  + fetal movement  Denies any VB, LO or UC's  Phone # [phone]  Pharmacy confirmed  Diabetic supplies: None needed at this time  BS value 166 post 2 hrs breakfast

## 2018-07-23 ENCOUNTER — ROUTINE PRENATAL (OUTPATIENT)
Dept: OBGYN | Facility: CLINIC | Age: 33
End: 2018-07-23

## 2018-07-23 DIAGNOSIS — O40.3XX1 POLYHYDRAMNIOS IN THIRD TRIMESTER COMPLICATION, FETUS 1 OF MULTIPLE GESTATION: ICD-10-CM

## 2018-07-23 LAB
NST ACOUSTIC STIMULATION: YES
NST ACTION NECESSARY: NORMAL
NST ASSESSMENT: NORMAL
NST BASELINE: 125
NST INDICATIONS: NORMAL
NST OTHER DATA: NORMAL
NST READ BY: NORMAL
NST RETURN: NORMAL
NST UTERINE ACTIVITY: NORMAL

## 2018-07-23 PROCEDURE — 59025 FETAL NON-STRESS TEST: CPT | Performed by: NURSE PRACTITIONER

## 2018-07-26 ENCOUNTER — ROUTINE PRENATAL (OUTPATIENT)
Dept: OBGYN | Facility: CLINIC | Age: 33
End: 2018-07-26

## 2018-07-26 ENCOUNTER — NON-PROVIDER VISIT (OUTPATIENT)
Dept: OBGYN | Facility: CLINIC | Age: 33
End: 2018-07-26

## 2018-07-26 VITALS — SYSTOLIC BLOOD PRESSURE: 106 MMHG | WEIGHT: 213 LBS | DIASTOLIC BLOOD PRESSURE: 58 MMHG | BODY MASS INDEX: 40.25 KG/M2

## 2018-07-26 DIAGNOSIS — N83.8 OVARIAN MASS, LEFT: ICD-10-CM

## 2018-07-26 DIAGNOSIS — Z34.82 ENCOUNTER FOR SUPERVISION OF OTHER NORMAL PREGNANCY, SECOND TRIMESTER: ICD-10-CM

## 2018-07-26 DIAGNOSIS — O24.913 DIABETES MELLITUS AFFECTING PREGNANCY IN THIRD TRIMESTER: ICD-10-CM

## 2018-07-26 DIAGNOSIS — O40.3XX1 POLYHYDRAMNIOS IN THIRD TRIMESTER COMPLICATION, FETUS 1 OF MULTIPLE GESTATION: ICD-10-CM

## 2018-07-26 LAB
GLUCOSE BLD-MCNC: 111 MG/DL (ref 70–100)
NST ACOUSTIC STIMULATION: NORMAL
NST ACTION NECESSARY: NORMAL
NST ASSESSMENT: NORMAL
NST BASELINE: NORMAL
NST INDICATIONS: NORMAL
NST OTHER DATA: NORMAL
NST READ BY: NORMAL
NST RETURN: NORMAL
NST UTERINE ACTIVITY: NORMAL

## 2018-07-26 PROCEDURE — 90040 PR PRENATAL FOLLOW UP: CPT | Performed by: OBSTETRICS & GYNECOLOGY

## 2018-07-26 PROCEDURE — 59025 FETAL NON-STRESS TEST: CPT | Performed by: OBSTETRICS & GYNECOLOGY

## 2018-07-26 PROCEDURE — 97803 MED NUTRITION INDIV SUBSEQ: CPT | Performed by: DIETITIAN, REGISTERED

## 2018-07-26 PROCEDURE — 82962 GLUCOSE BLOOD TEST: CPT | Performed by: OBSTETRICS & GYNECOLOGY

## 2018-07-26 NOTE — PROGRESS NOTES
Subjective:  -Pt states she is checking her blood sugars fasting and before meals.   -She is only eating 3-4 times a day.   -Not having a bedtime snack bc of nausea and abdominal pain   -Is taking insulin incorrectly; reports taking NPH 10 units in AM and 5 units HS (should be 5 AM & 10 HS)        Objective:   Anthropometrics:   Today's Weight: 213lb    Pre-pregnancy weight: 203  Total weight gain: +10  Weeks gestation: 34w5d    Biochemical data, medical test and procedures:  Lab Results   Component Value Date/Time    POCGLUCOSE 111 (A) 07/26/2018 11:05 AM       Glucose Log Book (most recent):    Fasting glucose range: 8 out 10 our >90    1 hour glucose range: 4 out of 24 are elevated (pre-prandial levels)     Assessment:   Recommended Diet:  1900 calorie meal plan (see media for detailed meal plan)    Assessment Notes:   Pt has been taking insulin incorrectly , checking FSBS incorrect timing, and not following her meal plan.   Instructed pt on proper timing to check insulin is 4 times a day (fasting and 1 hr PP). Based on MD note from 7/12 pt should be taking NPH 5 units AM and 10 units HS. She has been doing this backwards. Verbalized understanding of how to correct this. Discussed examples of snacks she can have for HS snack and stressed the importance of eating something so as not to go more than 10 hours over night without eating.     Plan: Monitoring & Evaluation  Goals:  1. Follow meal plan as outlined above; 3 meals and 3 snacks daily.   2. Check FSBS 4 times a day (fasting and 1 hour PP)    3. FSBS Goals= Fasting <90; 1 hour PP <130   4. Bring blood sugar log book to each appointment   5. Appropriate weight gain during pregnancy  6. Eat bedtime snack everynight, even if it is something small       Follow up 1 week   Tata Jimenes, RD, LD, CDE  [phone]

## 2018-07-26 NOTE — PROGRESS NOTES
Mary Santana 33 y.o.  34w5d here today for obstetrical visit. Patient reports good  fetal movement. denies contractions, denies vaginal bleeding, denies loss of fluid.    Pregnancy is complicated by   Patient Active Problem List    Diagnosis Date Noted   • Polyhydramnios in third trimester 2018   • Diabetes mellitus affecting pregnancy in third trimester 2018   • Ovarian mass, left 2018   • Encounter for supervision of other normal pregnancy, second trimester 2018       Fasting blood sugars range   Postprandial blood sugars range <140, with occasional sporadic elevations    Pt did not understand her insulin regimen, was taking wrong doses of insulin and checking glucoses before meals.  Tata counseled on proper dose and times to check glucoses. She understands now.    Insulin regimen  NPH a.m. 5 , regular a.m.0  Regular with dinner 0  NPH at bedtime 5     Followup in 1 week   labor precautions are reviewed  Continue kick counts daily after 28 weeks  NST twice weekly after 32 weeks if on insulin

## 2018-07-26 NOTE — PROGRESS NOTES
GDM Class  A2. OB F/U.  + fetal movement  Denies any VB, LO or UC's  Phone # [phone]  Pharmacy confirmed  Diabetic supplies: Non needed today   C/O: after administering insulin patient get an upset stomach. Pt also states she is having some swelling on her feet (pm)   BS Value 111 post 3 hrs breakfast

## 2018-07-30 ENCOUNTER — ROUTINE PRENATAL (OUTPATIENT)
Dept: OBGYN | Facility: CLINIC | Age: 33
End: 2018-07-30

## 2018-07-30 DIAGNOSIS — O40.3XX0 POLYHYDRAMNIOS IN THIRD TRIMESTER COMPLICATION, SINGLE OR UNSPECIFIED FETUS: ICD-10-CM

## 2018-07-30 DIAGNOSIS — O24.419 GDM, CLASS A2: ICD-10-CM

## 2018-07-30 LAB
NST ACOUSTIC STIMULATION: NORMAL
NST ACTION NECESSARY: NORMAL
NST ASSESSMENT: NORMAL
NST BASELINE: 135
NST INDICATIONS: NORMAL
NST OTHER DATA: NORMAL
NST READ BY: NORMAL
NST RETURN: NORMAL
NST UTERINE ACTIVITY: NORMAL

## 2018-07-30 PROCEDURE — 59025 FETAL NON-STRESS TEST: CPT | Performed by: NURSE PRACTITIONER

## 2018-07-30 NOTE — PROGRESS NOTES
S: Pt is a 33 y.o.  at 35w2d with  Estimated Date of Delivery: 18 who presents for scheduled NST for polyhydramnios and GDM. No complaints.  Denies VB, RUCs, LOF.  Reports good FM.      O: LMP 2017          FHTs: baseline 135, accels positive,  decels negative,  Variability moderate       [place]: 1 UC           A/P  Patient Active Problem List    Diagnosis Date Noted   • Polyhydramnios in third trimester 2018   • Diabetes mellitus affecting pregnancy in third trimester 2018   • Ovarian mass, left 2018   • Encounter for supervision of other normal pregnancy, second trimester 2018       1.  IUP @ 35w2d  2.  Reactive, category 1 NST.  3.  F/u as sched.  4.  Continue 2x weekly NST's

## 2018-08-02 ENCOUNTER — ROUTINE PRENATAL (OUTPATIENT)
Dept: OBGYN | Facility: CLINIC | Age: 33
End: 2018-08-02

## 2018-08-02 ENCOUNTER — HOSPITAL ENCOUNTER (OUTPATIENT)
Facility: MEDICAL CENTER | Age: 33
End: 2018-08-02
Attending: OBSTETRICS & GYNECOLOGY
Payer: COMMERCIAL

## 2018-08-02 VITALS — SYSTOLIC BLOOD PRESSURE: 119 MMHG | BODY MASS INDEX: 40.43 KG/M2 | DIASTOLIC BLOOD PRESSURE: 66 MMHG | WEIGHT: 214 LBS

## 2018-08-02 DIAGNOSIS — N83.8 OVARIAN MASS, LEFT: ICD-10-CM

## 2018-08-02 DIAGNOSIS — O09.43 HIGH RISK MULTIGRAVIDA, THIRD TRIMESTER: ICD-10-CM

## 2018-08-02 DIAGNOSIS — O24.913 DIABETES MELLITUS AFFECTING PREGNANCY IN THIRD TRIMESTER: ICD-10-CM

## 2018-08-02 DIAGNOSIS — O40.9XX0 POLYHYDRAMNIOS, ANTEPARTUM, SINGLE OR UNSPECIFIED FETUS: ICD-10-CM

## 2018-08-02 DIAGNOSIS — O40.3XX1 POLYHYDRAMNIOS IN THIRD TRIMESTER COMPLICATION, FETUS 1 OF MULTIPLE GESTATION: ICD-10-CM

## 2018-08-02 LAB
NST ACOUSTIC STIMULATION: YES
NST ACTION NECESSARY: NORMAL
NST ASSESSMENT: REACTIVE
NST BASELINE: 125
NST INDICATIONS: NORMAL
NST OTHER DATA: NORMAL
NST READ BY: NORMAL
NST RETURN: NORMAL
NST UTERINE ACTIVITY: NORMAL

## 2018-08-02 PROCEDURE — 90040 PR PRENATAL FOLLOW UP: CPT | Performed by: OBSTETRICS & GYNECOLOGY

## 2018-08-02 PROCEDURE — 59025 FETAL NON-STRESS TEST: CPT | Performed by: OBSTETRICS & GYNECOLOGY

## 2018-08-02 NOTE — PROGRESS NOTES
A chaperone was offered to the patient during today's exam. Chaperone name: Lubna ARRINGTON RN was present.   IOL scheduled 08/25/2018 @ 8am  Pt notified and instruction given.

## 2018-08-02 NOTE — PROGRESS NOTES
Pt here today for OB follow up  Pt states she has had irregular UC'S and pain with walking.  Reports +FM  Good # [phone]  Pharmacy Confirmed.  Chaperone offered and not indicated.   Random Accucheck:  GBS today.  Pt forgot book today.

## 2018-08-02 NOTE — PROGRESS NOTES
S: Pt here for OB follow up. Doing well.   positive fetal movement.    negative vaginal bleeding.    negative leakage of fluid.    Irregular contractions, worse at night.    Pt forgot her blood sugar log.  Reviewed diet.  Questions answered.    O: VSS Afeb      See prenatal vitals, chart for today's exam    FBS range: per patient all < 90 (70s to 80s)  1hr post prandial range: per pt all < 140    Insulin regimen  NPH a.m. 5u , NPH at bedtime 10u       A/P:  33 y.o.  35w5d with A2 GDM who presents for obstetric follow-up.  Polyhydramnios    1.  Labor precautions and fetal kick counts educated  2.  Change insulin dosage as follows:          No changes as pt forgot her log book  3.  NSTs: 2x/week  4.  Continue prenatal vitamins.  5.  Watch weight gain.  Exercise at least 30 minutes daily  6.  Increase water intake.  7.  IOL scheduled 18 at 8am  8.  Follow-up in 1 week for obstetric follow-up.  9.  GBS done today

## 2018-08-05 LAB — GP B STREP DNA SPEC QL NAA+PROBE: POSITIVE

## 2018-08-06 ENCOUNTER — ROUTINE PRENATAL (OUTPATIENT)
Dept: OBGYN | Facility: CLINIC | Age: 33
End: 2018-08-06

## 2018-08-06 ENCOUNTER — APPOINTMENT (OUTPATIENT)
Dept: OBGYN | Facility: CLINIC | Age: 33
End: 2018-08-06

## 2018-08-06 DIAGNOSIS — O40.9XX0 POLYHYDRAMNIOS AFFECTING PREGNANCY: ICD-10-CM

## 2018-08-06 LAB
NST ACOUSTIC STIMULATION: NORMAL
NST ACTION NECESSARY: NORMAL
NST ASSESSMENT: REACTIVE
NST BASELINE: 125
NST INDICATIONS: NORMAL
NST OTHER DATA: NORMAL
NST READ BY: NORMAL
NST RETURN: NORMAL
NST UTERINE ACTIVITY: NORMAL

## 2018-08-06 PROCEDURE — 59025 FETAL NON-STRESS TEST: CPT | Performed by: OBSTETRICS & GYNECOLOGY

## 2018-08-07 PROBLEM — O99.820 GROUP B STREPTOCOCCUS CARRIER, +RV CULTURE, CURRENTLY PREGNANT: Status: ACTIVE | Noted: 2018-08-07

## 2018-08-09 ENCOUNTER — ROUTINE PRENATAL (OUTPATIENT)
Dept: OBGYN | Facility: CLINIC | Age: 33
End: 2018-08-09

## 2018-08-09 VITALS — SYSTOLIC BLOOD PRESSURE: 119 MMHG | BODY MASS INDEX: 40.62 KG/M2 | DIASTOLIC BLOOD PRESSURE: 55 MMHG | WEIGHT: 215 LBS

## 2018-08-09 DIAGNOSIS — O24.913 DIABETES MELLITUS AFFECTING PREGNANCY IN THIRD TRIMESTER: ICD-10-CM

## 2018-08-09 DIAGNOSIS — N83.8 OVARIAN MASS, LEFT: ICD-10-CM

## 2018-08-09 DIAGNOSIS — O40.3XX1 POLYHYDRAMNIOS IN THIRD TRIMESTER COMPLICATION, FETUS 1 OF MULTIPLE GESTATION: ICD-10-CM

## 2018-08-09 DIAGNOSIS — O99.820 GROUP B STREPTOCOCCUS CARRIER, +RV CULTURE, CURRENTLY PREGNANT: ICD-10-CM

## 2018-08-09 DIAGNOSIS — O09.43 HIGH RISK MULTIGRAVIDA, THIRD TRIMESTER: ICD-10-CM

## 2018-08-09 DIAGNOSIS — O24.414 INSULIN CONTROLLED GESTATIONAL DIABETES MELLITUS (GDM) IN THIRD TRIMESTER: ICD-10-CM

## 2018-08-09 LAB
GLUCOSE BLD-MCNC: 114 MG/DL (ref 70–100)
NST ACOUSTIC STIMULATION: NORMAL
NST ACTION NECESSARY: NORMAL
NST ASSESSMENT: REACTIVE
NST BASELINE: 120
NST INDICATIONS: NORMAL
NST OTHER DATA: NORMAL
NST READ BY: NORMAL
NST RETURN: NORMAL
NST UTERINE ACTIVITY: NORMAL

## 2018-08-09 PROCEDURE — 90040 PR PRENATAL FOLLOW UP: CPT | Performed by: OBSTETRICS & GYNECOLOGY

## 2018-08-09 PROCEDURE — 59025 FETAL NON-STRESS TEST: CPT | Performed by: OBSTETRICS & GYNECOLOGY

## 2018-08-09 PROCEDURE — 82962 GLUCOSE BLOOD TEST: CPT | Performed by: OBSTETRICS & GYNECOLOGY

## 2018-08-09 NOTE — PROGRESS NOTES
MELITA:  36w5d    Pt reports doing well.  Denies vaginal bleeding, contractions, LOF.  Reports +FM.  Reports she doesn't feel well when she takes insulin    Insulin regimen  NPH a.m. 5u , NPH at bedtime 10u     BG log:  Fastin-88  PP breakfast:  42-78  PP lunch: 56-87  PP dinner:     LMP 2017   gen: AAO, NAD  FHTs: RNST, 120  FH: 40    A/P: 33 y.o.  @ 36w5d by lmp c/w 22wk US w/ A2DM   - poly on last US, will also repeat growth given S>D w/ A2DM prior to IOL.  - pt with significant hypoglycemia on insulin; will d/c insulin for 1 week and have pt return for re-evaluation of need for insulin.  Would consider metformin alternate if needs additional coverage without risk of hypoglycemia   New regimen: no insulin  - 9cm L ovarian mass, suspected dermoid  - GSB + for PCn in labor  - IOL scheduled for     Reviewed labor precautions (to call or come to hospital for ctx q5min, VB, LOF, decreased FM)    RTC 1wk  Cont NST 2x weekly    Breanna Segura MD  [facility], Women's Health

## 2018-08-09 NOTE — PROGRESS NOTES
GDM Class  A2. OB F/U.  + fetal movement  Denies any VB, LO or UC's  Phone # [phone]  Pharmacy confirmed  Diabetic supplies: None needed today   C/O: irregular UC  GBS+  IOL 8/25/2018 at 8am

## 2018-08-13 ENCOUNTER — ROUTINE PRENATAL (OUTPATIENT)
Dept: OBGYN | Facility: CLINIC | Age: 33
End: 2018-08-13

## 2018-08-13 DIAGNOSIS — O24.419 GDM, CLASS A2: ICD-10-CM

## 2018-08-13 DIAGNOSIS — O40.9XX0 POLYHYDRAMNIOS, ANTEPARTUM, SINGLE OR UNSPECIFIED FETUS: ICD-10-CM

## 2018-08-13 LAB
NST ACOUSTIC STIMULATION: NEGATIVE
NST ACTION NECESSARY: NORMAL
NST ASSESSMENT: REACTIVE
NST BASELINE: 140
NST INDICATIONS: NORMAL
NST OTHER DATA: NORMAL
NST READ BY: NORMAL
NST RETURN: NORMAL
NST UTERINE ACTIVITY: NEGATIVE

## 2018-08-13 PROCEDURE — 90040 PR PRENATAL FOLLOW UP: CPT | Performed by: OBSTETRICS & GYNECOLOGY

## 2018-08-13 PROCEDURE — 59025 FETAL NON-STRESS TEST: CPT | Performed by: OBSTETRICS & GYNECOLOGY

## 2018-08-14 ENCOUNTER — APPOINTMENT (OUTPATIENT)
Dept: RADIOLOGY | Facility: IMAGING CENTER | Age: 33
End: 2018-08-14
Attending: OBSTETRICS & GYNECOLOGY

## 2018-08-14 DIAGNOSIS — O24.913 DIABETES MELLITUS AFFECTING PREGNANCY IN THIRD TRIMESTER: ICD-10-CM

## 2018-08-14 DIAGNOSIS — O09.43 HIGH RISK MULTIGRAVIDA, THIRD TRIMESTER: ICD-10-CM

## 2018-08-14 PROCEDURE — 76816 OB US FOLLOW-UP PER FETUS: CPT | Mod: 26 | Performed by: OBSTETRICS & GYNECOLOGY

## 2018-08-16 ENCOUNTER — ROUTINE PRENATAL (OUTPATIENT)
Dept: OBGYN | Facility: CLINIC | Age: 33
End: 2018-08-16

## 2018-08-16 VITALS — DIASTOLIC BLOOD PRESSURE: 59 MMHG | SYSTOLIC BLOOD PRESSURE: 124 MMHG | BODY MASS INDEX: 41.57 KG/M2 | WEIGHT: 220 LBS

## 2018-08-16 DIAGNOSIS — O09.43 HIGH RISK MULTIGRAVIDA, THIRD TRIMESTER: ICD-10-CM

## 2018-08-16 DIAGNOSIS — O40.3XX0 POLYHYDRAMNIOS IN THIRD TRIMESTER COMPLICATION, SINGLE OR UNSPECIFIED FETUS: ICD-10-CM

## 2018-08-16 DIAGNOSIS — O99.820 GROUP B STREPTOCOCCUS CARRIER, +RV CULTURE, CURRENTLY PREGNANT: ICD-10-CM

## 2018-08-16 DIAGNOSIS — O24.913 DIABETES MELLITUS AFFECTING PREGNANCY IN THIRD TRIMESTER: ICD-10-CM

## 2018-08-16 DIAGNOSIS — N83.8 OVARIAN MASS, LEFT: ICD-10-CM

## 2018-08-16 DIAGNOSIS — O24.419 GDM, CLASS A2: ICD-10-CM

## 2018-08-16 LAB
GLUCOSE BLD-MCNC: 101 MG/DL (ref 70–100)
NST ACOUSTIC STIMULATION: NORMAL
NST ACTION NECESSARY: NORMAL
NST ASSESSMENT: REACTIVE
NST BASELINE: 130
NST INDICATIONS: NORMAL
NST OTHER DATA: NORMAL
NST READ BY: NORMAL
NST RETURN: NORMAL
NST UTERINE ACTIVITY: NORMAL

## 2018-08-16 PROCEDURE — 90040 PR PRENATAL FOLLOW UP: CPT | Performed by: OBSTETRICS & GYNECOLOGY

## 2018-08-16 PROCEDURE — 59025 FETAL NON-STRESS TEST: CPT | Performed by: OBSTETRICS & GYNECOLOGY

## 2018-08-16 PROCEDURE — 82962 GLUCOSE BLOOD TEST: CPT | Performed by: OBSTETRICS & GYNECOLOGY

## 2018-08-16 NOTE — PROGRESS NOTES
Pt here today for OB follow up  Pt states having contractions.   Reports +FM  Good # [phone]  Pharmacy Confirmed.  Random BS: 101-had lunch 12:45.

## 2018-08-16 NOTE — PROGRESS NOTES
S: Pt here for OB follow up. Doing well.   positive fetal movement.    negative vaginal bleeding.    negative leakage of fluid.    positive - irregular contractions.    Patient forgot blood sugar log today.  Reviewed diet.  Questions answered.    O: VSS Afeb      See prenatal vitals, chart for today's exam    FBS range: all normal per pt  1hr post prandial range: all < 140 per pt    Insulin regimen  NPH a.m. 5 units, NPH at bedtime 10 units       A/P:  33 y.o.  37w5d with A2 GDM who presents for obstetric follow-up.    1.  Labor precautions and fetal kick counts educated  2.  Change insulin dosage as follows:          NO changes, forgot sugar log  3.  NSTs: 2x/week.  4.  Continue prenatal vitamins.  5.  Watch weight gain.  Exercise at least 30 minutes daily  6.  Increase water intake.  7.  Follow-up in 1 weeks for obstetric follow-up.  8.  IOL  at 8am

## 2018-08-20 ENCOUNTER — ROUTINE PRENATAL (OUTPATIENT)
Dept: OBGYN | Facility: CLINIC | Age: 33
End: 2018-08-20

## 2018-08-20 DIAGNOSIS — O24.419 GDM, CLASS A2: ICD-10-CM

## 2018-08-20 DIAGNOSIS — O40.3XX0 POLYHYDRAMNIOS AFFECTING PREGNANCY IN THIRD TRIMESTER: ICD-10-CM

## 2018-08-20 LAB
NST ACOUSTIC STIMULATION: NORMAL
NST ACTION NECESSARY: NORMAL
NST ASSESSMENT: REACTIVE
NST BASELINE: 135
NST INDICATIONS: NORMAL
NST OTHER DATA: NORMAL
NST READ BY: NORMAL
NST RETURN: NORMAL
NST UTERINE ACTIVITY: NORMAL

## 2018-08-20 PROCEDURE — 59025 FETAL NON-STRESS TEST: CPT | Performed by: OBSTETRICS & GYNECOLOGY

## 2018-08-23 ENCOUNTER — ROUTINE PRENATAL (OUTPATIENT)
Dept: OBGYN | Facility: CLINIC | Age: 33
End: 2018-08-23

## 2018-08-23 VITALS — WEIGHT: 220 LBS | BODY MASS INDEX: 41.57 KG/M2 | DIASTOLIC BLOOD PRESSURE: 61 MMHG | SYSTOLIC BLOOD PRESSURE: 131 MMHG

## 2018-08-23 DIAGNOSIS — O99.820 GROUP B STREPTOCOCCUS CARRIER, +RV CULTURE, CURRENTLY PREGNANT: ICD-10-CM

## 2018-08-23 DIAGNOSIS — O24.913 DIABETES MELLITUS AFFECTING PREGNANCY IN THIRD TRIMESTER: ICD-10-CM

## 2018-08-23 DIAGNOSIS — O24.419 GDM, CLASS A2: ICD-10-CM

## 2018-08-23 DIAGNOSIS — O09.43 HIGH RISK MULTIGRAVIDA, THIRD TRIMESTER: ICD-10-CM

## 2018-08-23 DIAGNOSIS — N83.8 OVARIAN MASS, LEFT: ICD-10-CM

## 2018-08-23 LAB
GLUCOSE BLD-MCNC: 117 MG/DL (ref 70–100)
NST ACOUSTIC STIMULATION: NORMAL
NST ACTION NECESSARY: NORMAL
NST ASSESSMENT: NORMAL
NST BASELINE: NORMAL
NST INDICATIONS: NORMAL
NST OTHER DATA: NORMAL
NST READ BY: NORMAL
NST RETURN: NORMAL
NST UTERINE ACTIVITY: NORMAL

## 2018-08-23 PROCEDURE — 90040 PR PRENATAL FOLLOW UP: CPT | Performed by: OBSTETRICS & GYNECOLOGY

## 2018-08-23 PROCEDURE — 82962 GLUCOSE BLOOD TEST: CPT | Performed by: OBSTETRICS & GYNECOLOGY

## 2018-08-23 PROCEDURE — 59025 FETAL NON-STRESS TEST: CPT | Performed by: OBSTETRICS & GYNECOLOGY

## 2018-08-23 NOTE — PROGRESS NOTES
OB f/u GDM today. Good # [phone]  Good FM  Pt states having contractions that come and go.   Pharmacy verified.  Diabetic supplies  BS in clinic : 117 Pt states last ate at 1:00 pm ( ate chicken soup)  Pt states was told by provider on 8/9/18 to discontinue her insulin due to low sugar levels.   Pt scheduled for IOL on 8/25/18 @ 0800 am, pt aware.

## 2018-08-23 NOTE — PROGRESS NOTES
Mary Santana 33 y.o.  38w5d here today for obstetrical visit. Patient reports good  fetal movement. denies contractions, denies vaginal bleeding, denies loss of fluid.    Pregnancy is complicated by   Patient Active Problem List    Diagnosis Date Noted   • Group B Streptococcus carrier, +RV culture, currently pregnant 2018   • Polyhydramnios in third trimester - ELOY 20cm 2018   • Diabetes mellitus affecting pregnancy in third trimester 2018   • Ovarian mass, left - 8.9cm --> needs removal 6wks PP for financial reasons 2018   • High risk multigravida, third trimester 2018       GBS  positive  Fasting blood sugars range   Postprandial blood sugars range <140    Will restart insulin at 5 units NPH at hs         Scheduled for IOL 18  Labor precautions are reviewed  Continue kick counts daily after 28 weeks  NST twice weekly after 32 weeks if on insulin

## 2018-08-25 ENCOUNTER — HOSPITAL ENCOUNTER (INPATIENT)
Facility: MEDICAL CENTER | Age: 33
LOS: 3 days | End: 2018-08-28
Attending: OBSTETRICS & GYNECOLOGY | Admitting: OBSTETRICS & GYNECOLOGY
Payer: MEDICAID

## 2018-08-25 LAB
BASOPHILS # BLD AUTO: 0.5 % (ref 0–1.8)
BASOPHILS # BLD: 0.05 K/UL (ref 0–0.12)
EOSINOPHIL # BLD AUTO: 0.09 K/UL (ref 0–0.51)
EOSINOPHIL NFR BLD: 0.9 % (ref 0–6.9)
ERYTHROCYTE [DISTWIDTH] IN BLOOD BY AUTOMATED COUNT: 43.5 FL (ref 35.9–50)
GLUCOSE BLD-MCNC: 113 MG/DL (ref 65–99)
GLUCOSE BLD-MCNC: 143 MG/DL (ref 65–99)
GLUCOSE BLD-MCNC: 72 MG/DL (ref 65–99)
GLUCOSE BLD-MCNC: 91 MG/DL (ref 65–99)
HCT VFR BLD AUTO: 34.4 % (ref 37–47)
HGB BLD-MCNC: 10.9 G/DL (ref 12–16)
HOLDING TUBE BB 8507: NORMAL
IMM GRANULOCYTES # BLD AUTO: 0.05 K/UL (ref 0–0.11)
IMM GRANULOCYTES NFR BLD AUTO: 0.5 % (ref 0–0.9)
LYMPHOCYTES # BLD AUTO: 2.1 K/UL (ref 1–4.8)
LYMPHOCYTES NFR BLD: 21.2 % (ref 22–41)
MCH RBC QN AUTO: 25.3 PG (ref 27–33)
MCHC RBC AUTO-ENTMCNC: 31.7 G/DL (ref 33.6–35)
MCV RBC AUTO: 79.8 FL (ref 81.4–97.8)
MONOCYTES # BLD AUTO: 0.6 K/UL (ref 0–0.85)
MONOCYTES NFR BLD AUTO: 6.1 % (ref 0–13.4)
NEUTROPHILS # BLD AUTO: 7.01 K/UL (ref 2–7.15)
NEUTROPHILS NFR BLD: 70.8 % (ref 44–72)
NRBC # BLD AUTO: 0 K/UL
NRBC BLD-RTO: 0 /100 WBC
PLATELET # BLD AUTO: 216 K/UL (ref 164–446)
PMV BLD AUTO: 10.6 FL (ref 9–12.9)
RBC # BLD AUTO: 4.31 M/UL (ref 4.2–5.4)
WBC # BLD AUTO: 9.9 K/UL (ref 4.8–10.8)

## 2018-08-25 PROCEDURE — 700101 HCHG RX REV CODE 250: Performed by: NURSE PRACTITIONER

## 2018-08-25 PROCEDURE — 85025 COMPLETE CBC W/AUTO DIFF WBC: CPT

## 2018-08-25 PROCEDURE — 700102 HCHG RX REV CODE 250 W/ 637 OVERRIDE(OP): Performed by: STUDENT IN AN ORGANIZED HEALTH CARE EDUCATION/TRAINING PROGRAM

## 2018-08-25 PROCEDURE — 3E033VJ INTRODUCTION OF OTHER HORMONE INTO PERIPHERAL VEIN, PERCUTANEOUS APPROACH: ICD-10-PCS | Performed by: OBSTETRICS & GYNECOLOGY

## 2018-08-25 PROCEDURE — 770002 HCHG ROOM/CARE - OB PRIVATE (112)

## 2018-08-25 PROCEDURE — 700111 HCHG RX REV CODE 636 W/ 250 OVERRIDE (IP): Performed by: STUDENT IN AN ORGANIZED HEALTH CARE EDUCATION/TRAINING PROGRAM

## 2018-08-25 PROCEDURE — 82962 GLUCOSE BLOOD TEST: CPT

## 2018-08-25 PROCEDURE — A9270 NON-COVERED ITEM OR SERVICE: HCPCS | Performed by: STUDENT IN AN ORGANIZED HEALTH CARE EDUCATION/TRAINING PROGRAM

## 2018-08-25 PROCEDURE — 700105 HCHG RX REV CODE 258: Performed by: STUDENT IN AN ORGANIZED HEALTH CARE EDUCATION/TRAINING PROGRAM

## 2018-08-25 RX ORDER — SODIUM CHLORIDE, SODIUM LACTATE, POTASSIUM CHLORIDE, CALCIUM CHLORIDE 600; 310; 30; 20 MG/100ML; MG/100ML; MG/100ML; MG/100ML
INJECTION, SOLUTION INTRAVENOUS
Status: ACTIVE
Start: 2018-08-25 | End: 2018-08-25

## 2018-08-25 RX ORDER — MISOPROSTOL 200 UG/1
800 TABLET ORAL
Status: DISCONTINUED | OUTPATIENT
Start: 2018-08-25 | End: 2018-08-26 | Stop reason: HOSPADM

## 2018-08-25 RX ORDER — METHYLERGONOVINE MALEATE 0.2 MG/ML
0.2 INJECTION INTRAVENOUS
Status: DISCONTINUED | OUTPATIENT
Start: 2018-08-25 | End: 2018-08-26 | Stop reason: HOSPADM

## 2018-08-25 RX ORDER — SODIUM CHLORIDE, SODIUM LACTATE, POTASSIUM CHLORIDE, CALCIUM CHLORIDE 600; 310; 30; 20 MG/100ML; MG/100ML; MG/100ML; MG/100ML
INJECTION, SOLUTION INTRAVENOUS CONTINUOUS
Status: ACTIVE | OUTPATIENT
Start: 2018-08-25 | End: 2018-08-25

## 2018-08-25 RX ADMIN — DINOPROSTONE 5 MG: 20 SUPPOSITORY VAGINAL at 22:30

## 2018-08-25 RX ADMIN — SODIUM CHLORIDE 2.5 MILLION UNITS: 9 INJECTION, SOLUTION INTRAVENOUS at 18:00

## 2018-08-25 RX ADMIN — MISOPROSTOL 25 MCG: 100 TABLET ORAL at 09:45

## 2018-08-25 RX ADMIN — Medication 2 MILLI-UNITS/MIN: at 18:09

## 2018-08-25 RX ADMIN — SODIUM CHLORIDE 5 MILLION UNITS: 900 INJECTION INTRAVENOUS at 09:45

## 2018-08-25 RX ADMIN — SODIUM CHLORIDE 2.5 MILLION UNITS: 9 INJECTION, SOLUTION INTRAVENOUS at 13:38

## 2018-08-25 RX ADMIN — MISOPROSTOL 25 MCG: 100 TABLET ORAL at 14:15

## 2018-08-25 ASSESSMENT — PATIENT HEALTH QUESTIONNAIRE - PHQ9
1. LITTLE INTEREST OR PLEASURE IN DOING THINGS: NOT AT ALL
1. LITTLE INTEREST OR PLEASURE IN DOING THINGS: NOT AT ALL
2. FEELING DOWN, DEPRESSED, IRRITABLE, OR HOPELESS: NOT AT ALL
SUM OF ALL RESPONSES TO PHQ9 QUESTIONS 1 AND 2: 0
2. FEELING DOWN, DEPRESSED, IRRITABLE, OR HOPELESS: NOT AT ALL
SUM OF ALL RESPONSES TO PHQ9 QUESTIONS 1 AND 2: 0

## 2018-08-25 ASSESSMENT — PAIN SCALES - GENERAL
PAINLEVEL_OUTOF10: 0
PAINLEVEL_OUTOF10: 0
PAINLEVEL_OUTOF10: 2
PAINLEVEL_OUTOF10: 1

## 2018-08-25 ASSESSMENT — LIFESTYLE VARIABLES
EVER_SMOKED: NEVER
ALCOHOL_USE: NO

## 2018-08-25 NOTE — PROGRESS NOTES
"0820- pt arrived for IOL-GDM. [place] and US on, VS/S.  SVE Cl/th/high, MARYANA Kramer, RN.  Pt did not eat breakfast, pt has not eaten since last night at 2130, pt drinking 1/2 juice.  0825- Dr. Guerrero updated on pt's status. Orders to allow pt to eat breakfast.  Dr. Franco at the bedside, order for cytotec placement.  0835- Dr. Franco confirmed vertex presentation with bedside US.  0842- FSBS 113  1030- pt finished eating.  1212- FS BS 91  Dr. Franco updated on pt's status, pt allowed to eat cracker and juice, per Dr. Franco.  1355- pt has been sleeping, pt states,\" I have been having very small cramping but spaced out.\"  SVE unchanged, MARYANA Kramer, RN.  1400- Dr. Raphael updated on pt's status, orders to allow pt to eat lunch and place second cytotec.  1415- second cytotec placed.  1556- 1hr PP, FSBS 143.  1700- Charge RN to monitor pt.  1900- Report given to NICOLE Zimmerman RN.  "

## 2018-08-25 NOTE — H&P
History and Physical      Mary Santana is a 33 y.o. year old female  at 39w0d dated by LMP and verified by second trimester U/S who presents for IOL for A2GDM    Reports contractions last night but none this morning.  PNC with the [facility] starting at 22w2d.  Pregnancy complicate by A2GDM was on NPH 5 units hs but due to hypoglycemia insulin was discontinued.  Noted to have ELOY of 20.06 via ultrasound on .  She has a 9.4 cm complex left adnexal mass will need removal at 6 weeks PP.    Subjective:   positive  For CTXS.   negative Feels pain   negative for LOF  negative for vaginal bleeding.   positive for fetal movement    ROS: Patient denies fever, chills, nausea, vomiting , headache, visual disturbance, or dysuria.    Past Medical History:   Diagnosis Date   • Cholelithiasis 2010   • Hypertension 2015    Pt states was taking Lisinopril 10 mg but stopped taking on her own more than a year ago.      Past Surgical History:   Procedure Laterality Date   • DILATION AND CURETTAGE  ,    due to SAB     OB History    Para Term  AB Living   6 3 3   2 3   SAB TAB Ectopic Molar Multiple Live Births   2         3      # Outcome Date GA Lbr Akshat/2nd Weight Sex Delivery Anes PTL Lv   6 Current            5 Term 11/28/10 40w0d  3.175 kg (7 lb) F Vag-Spont None N DEVIN      Birth Comments: Pt states was induced, due to gallbladder pains.    4 Term 09 40w0d  3.374 kg (7 lb 7 oz) F Vag-Spont None  DEVIN   3 SAB 07 11w0d    SAB         Birth Comments: D&C done 1 day procedure   2 Term 05 41w0d  3.969 kg (8 lb 12 oz) M Vag-Spont None  DEVIN   1 SAB  11w0d    SAB  N       Birth Comments: d&c  done 1 day procedure , nop cmplications        Social History     Social History   • Marital status: Single     Spouse name: N/A   • Number of children: N/A   • Years of education: N/A     Occupational History   • Not on file.     Social History Main Topics   • Smoking status: Never Smoker  "  • Smokeless tobacco: Never Used   • Alcohol use No   • Drug use: No   • Sexual activity: Yes     Partners: Male      Comment: none     Other Topics Concern   • Not on file     Social History Narrative   • No narrative on file     Allergies: Nkda [no known drug allergy]  No current facility-administered medications on file prior to encounter.      No current outpatient prescriptions on file prior to encounter.         Objective:      Blood pressure 112/64, pulse 85, temperature 36.6 °C (97.9 °F), temperature source Temporal, resp. rate 18, height 1.549 m (5' 1\"), weight 99.8 kg (220 lb), last menstrual period 2017.    General: Afebrile, NAD  Lungs: CTABL  Heart: RRR, NRMG  Abdomen: gravid, nontender,   Skin: No rash  Extremities: no peripheral edema  EFW: 3800  FHRT: 130, moderate variability, + acels, no decels  Presentation: vertex, verified by bedside ultrasound  Cervix 1/thick/high      Lab Review   Blood type: O    Hgb: non-reactive   HIV: non-reactive   GC: negative   Chlamydia: negative    Rubella: immune    GBS: positive  1 hr GTT:85      Assessment/Plan:   Mary Santana is a 33 y.o. year old female  at 39w0d dated by LMP and verified by second trimester U/S who presents for IOL for A2GDM      - Admit to L&D  - Anticipate   - IOL: cytotec Q4 hours, pitocin augmentation when cervix is favorable   - GBS positive: ppx with pcn  -  PNL wnl, O+  - A2 GDM: will get fasting glucose and then Q4 hour glucose checks   - pain management: undecided at this time           18:  Pt was seen and evaluated with resident physician. Agrees with exam/plan and management.         "

## 2018-08-26 LAB
ERYTHROCYTE [DISTWIDTH] IN BLOOD BY AUTOMATED COUNT: 42.3 FL (ref 35.9–50)
ERYTHROCYTE [DISTWIDTH] IN BLOOD BY AUTOMATED COUNT: 42.4 FL (ref 35.9–50)
GLUCOSE BLD-MCNC: 83 MG/DL (ref 65–99)
GLUCOSE BLD-MCNC: 85 MG/DL (ref 65–99)
GLUCOSE BLD-MCNC: 92 MG/DL (ref 65–99)
HCT VFR BLD AUTO: 29.9 % (ref 37–47)
HCT VFR BLD AUTO: 31.3 % (ref 37–47)
HGB BLD-MCNC: 10.1 G/DL (ref 12–16)
HGB BLD-MCNC: 9.5 G/DL (ref 12–16)
MCH RBC QN AUTO: 24.8 PG (ref 27–33)
MCH RBC QN AUTO: 25 PG (ref 27–33)
MCHC RBC AUTO-ENTMCNC: 31.8 G/DL (ref 33.6–35)
MCHC RBC AUTO-ENTMCNC: 32.3 G/DL (ref 33.6–35)
MCV RBC AUTO: 77.5 FL (ref 81.4–97.8)
MCV RBC AUTO: 78.1 FL (ref 81.4–97.8)
PLATELET # BLD AUTO: 193 K/UL (ref 164–446)
PLATELET # BLD AUTO: 217 K/UL (ref 164–446)
PMV BLD AUTO: 10.2 FL (ref 9–12.9)
PMV BLD AUTO: 10.2 FL (ref 9–12.9)
RBC # BLD AUTO: 3.83 M/UL (ref 4.2–5.4)
RBC # BLD AUTO: 4.04 M/UL (ref 4.2–5.4)
WBC # BLD AUTO: 13.9 K/UL (ref 4.8–10.8)
WBC # BLD AUTO: 14.1 K/UL (ref 4.8–10.8)

## 2018-08-26 PROCEDURE — 85027 COMPLETE CBC AUTOMATED: CPT

## 2018-08-26 PROCEDURE — 700105 HCHG RX REV CODE 258

## 2018-08-26 PROCEDURE — 36415 COLL VENOUS BLD VENIPUNCTURE: CPT

## 2018-08-26 PROCEDURE — 304965 HCHG RECOVERY SERVICES

## 2018-08-26 PROCEDURE — 59409 OBSTETRICAL CARE: CPT

## 2018-08-26 PROCEDURE — 700105 HCHG RX REV CODE 258: Performed by: STUDENT IN AN ORGANIZED HEALTH CARE EDUCATION/TRAINING PROGRAM

## 2018-08-26 PROCEDURE — 700111 HCHG RX REV CODE 636 W/ 250 OVERRIDE (IP): Performed by: STUDENT IN AN ORGANIZED HEALTH CARE EDUCATION/TRAINING PROGRAM

## 2018-08-26 PROCEDURE — 770002 HCHG ROOM/CARE - OB PRIVATE (112)

## 2018-08-26 PROCEDURE — 700102 HCHG RX REV CODE 250 W/ 637 OVERRIDE(OP): Performed by: STUDENT IN AN ORGANIZED HEALTH CARE EDUCATION/TRAINING PROGRAM

## 2018-08-26 PROCEDURE — 82962 GLUCOSE BLOOD TEST: CPT | Mod: 91

## 2018-08-26 PROCEDURE — A9270 NON-COVERED ITEM OR SERVICE: HCPCS | Performed by: STUDENT IN AN ORGANIZED HEALTH CARE EDUCATION/TRAINING PROGRAM

## 2018-08-26 PROCEDURE — 10907ZC DRAINAGE OF AMNIOTIC FLUID, THERAPEUTIC FROM PRODUCTS OF CONCEPTION, VIA NATURAL OR ARTIFICIAL OPENING: ICD-10-PCS | Performed by: OBSTETRICS & GYNECOLOGY

## 2018-08-26 RX ORDER — HYDROCODONE BITARTRATE AND ACETAMINOPHEN 5; 325 MG/1; MG/1
1 TABLET ORAL EVERY 4 HOURS PRN
Status: DISCONTINUED | OUTPATIENT
Start: 2018-08-26 | End: 2018-08-28 | Stop reason: HOSPADM

## 2018-08-26 RX ORDER — LIDOCAINE HYDROCHLORIDE 10 MG/ML
INJECTION, SOLUTION EPIDURAL; INFILTRATION; INTRACAUDAL; PERINEURAL
Status: COMPLETED
Start: 2018-08-26 | End: 2018-08-26

## 2018-08-26 RX ORDER — SODIUM CHLORIDE, SODIUM LACTATE, POTASSIUM CHLORIDE, CALCIUM CHLORIDE 600; 310; 30; 20 MG/100ML; MG/100ML; MG/100ML; MG/100ML
INJECTION, SOLUTION INTRAVENOUS
Status: COMPLETED
Start: 2018-08-26 | End: 2018-08-26

## 2018-08-26 RX ORDER — DOCUSATE SODIUM 100 MG/1
100 CAPSULE, LIQUID FILLED ORAL 2 TIMES DAILY PRN
Status: DISCONTINUED | OUTPATIENT
Start: 2018-08-26 | End: 2018-08-28 | Stop reason: HOSPADM

## 2018-08-26 RX ORDER — IBUPROFEN 800 MG/1
800 TABLET ORAL EVERY 8 HOURS PRN
Status: CANCELLED | OUTPATIENT
Start: 2018-08-26

## 2018-08-26 RX ORDER — ONDANSETRON 2 MG/ML
4 INJECTION INTRAMUSCULAR; INTRAVENOUS EVERY 6 HOURS PRN
Status: DISCONTINUED | OUTPATIENT
Start: 2018-08-26 | End: 2018-08-28 | Stop reason: HOSPADM

## 2018-08-26 RX ORDER — VITAMIN A ACETATE, BETA CAROTENE, ASCORBIC ACID, CHOLECALCIFEROL, .ALPHA.-TOCOPHEROL ACETATE, DL-, THIAMINE MONONITRATE, RIBOFLAVIN, NIACINAMIDE, PYRIDOXINE HYDROCHLORIDE, FOLIC ACID, CYANOCOBALAMIN, CALCIUM CARBONATE, FERROUS FUMARATE, ZINC OXIDE, CUPRIC OXIDE 3080; 12; 120; 400; 1; 1.84; 3; 20; 22; 920; 25; 200; 27; 10; 2 [IU]/1; UG/1; MG/1; [IU]/1; MG/1; MG/1; MG/1; MG/1; MG/1; [IU]/1; MG/1; MG/1; MG/1; MG/1; MG/1
1 TABLET, FILM COATED ORAL EVERY MORNING
Status: CANCELLED | OUTPATIENT
Start: 2018-08-27

## 2018-08-26 RX ORDER — ACETAMINOPHEN 325 MG/1
650 TABLET ORAL EVERY 4 HOURS PRN
Status: DISCONTINUED | OUTPATIENT
Start: 2018-08-26 | End: 2018-08-26

## 2018-08-26 RX ORDER — ACETAMINOPHEN 325 MG/1
325 TABLET ORAL EVERY 4 HOURS PRN
Status: DISCONTINUED | OUTPATIENT
Start: 2018-08-26 | End: 2018-08-26

## 2018-08-26 RX ORDER — ACETAMINOPHEN 325 MG/1
650 TABLET ORAL EVERY 4 HOURS PRN
Status: CANCELLED | OUTPATIENT
Start: 2018-08-26

## 2018-08-26 RX ORDER — IBUPROFEN 600 MG/1
600 TABLET ORAL EVERY 6 HOURS PRN
Status: DISCONTINUED | OUTPATIENT
Start: 2018-08-26 | End: 2018-08-28

## 2018-08-26 RX ORDER — OXYCODONE HYDROCHLORIDE AND ACETAMINOPHEN 5; 325 MG/1; MG/1
2 TABLET ORAL EVERY 4 HOURS PRN
Status: DISCONTINUED | OUTPATIENT
Start: 2018-08-26 | End: 2018-08-28

## 2018-08-26 RX ORDER — ACETAMINOPHEN 325 MG/1
325 TABLET ORAL EVERY 4 HOURS PRN
Status: CANCELLED | OUTPATIENT
Start: 2018-08-26

## 2018-08-26 RX ORDER — IBUPROFEN 800 MG/1
800 TABLET ORAL EVERY 8 HOURS PRN
Status: DISCONTINUED | OUTPATIENT
Start: 2018-08-26 | End: 2018-08-28 | Stop reason: HOSPADM

## 2018-08-26 RX ORDER — ONDANSETRON 2 MG/ML
4 INJECTION INTRAMUSCULAR; INTRAVENOUS EVERY 6 HOURS PRN
Status: CANCELLED | OUTPATIENT
Start: 2018-08-26

## 2018-08-26 RX ORDER — ACETAMINOPHEN 325 MG/1
325 TABLET ORAL EVERY 4 HOURS PRN
Status: DISCONTINUED | OUTPATIENT
Start: 2018-08-26 | End: 2018-08-28 | Stop reason: HOSPADM

## 2018-08-26 RX ORDER — ONDANSETRON 4 MG/1
4 TABLET, ORALLY DISINTEGRATING ORAL EVERY 6 HOURS PRN
Status: DISCONTINUED | OUTPATIENT
Start: 2018-08-26 | End: 2018-08-28 | Stop reason: HOSPADM

## 2018-08-26 RX ORDER — HYDROCODONE BITARTRATE AND ACETAMINOPHEN 5; 325 MG/1; MG/1
2 TABLET ORAL EVERY 4 HOURS PRN
Status: DISCONTINUED | OUTPATIENT
Start: 2018-08-26 | End: 2018-08-28 | Stop reason: HOSPADM

## 2018-08-26 RX ORDER — DOCUSATE SODIUM 100 MG/1
100 CAPSULE, LIQUID FILLED ORAL 2 TIMES DAILY PRN
Status: CANCELLED | OUTPATIENT
Start: 2018-08-26

## 2018-08-26 RX ORDER — VITAMIN A ACETATE, BETA CAROTENE, ASCORBIC ACID, CHOLECALCIFEROL, .ALPHA.-TOCOPHEROL ACETATE, DL-, THIAMINE MONONITRATE, RIBOFLAVIN, NIACINAMIDE, PYRIDOXINE HYDROCHLORIDE, FOLIC ACID, CYANOCOBALAMIN, CALCIUM CARBONATE, FERROUS FUMARATE, ZINC OXIDE, CUPRIC OXIDE 3080; 12; 120; 400; 1; 1.84; 3; 20; 22; 920; 25; 200; 27; 10; 2 [IU]/1; UG/1; MG/1; [IU]/1; MG/1; MG/1; MG/1; MG/1; MG/1; [IU]/1; MG/1; MG/1; MG/1; MG/1; MG/1
1 TABLET, FILM COATED ORAL EVERY MORNING
Status: DISCONTINUED | OUTPATIENT
Start: 2018-08-27 | End: 2018-08-28 | Stop reason: HOSPADM

## 2018-08-26 RX ADMIN — FENTANYL CITRATE 100 MCG: 50 INJECTION INTRAMUSCULAR; INTRAVENOUS at 07:35

## 2018-08-26 RX ADMIN — SODIUM CHLORIDE, SODIUM LACTATE, POTASSIUM CHLORIDE, CALCIUM CHLORIDE 1000 ML: 600; 310; 30; 20 INJECTION, SOLUTION INTRAVENOUS at 03:18

## 2018-08-26 RX ADMIN — FENTANYL CITRATE 100 MCG: 50 INJECTION INTRAMUSCULAR; INTRAVENOUS at 10:45

## 2018-08-26 RX ADMIN — FENTANYL CITRATE 100 MCG: 50 INJECTION INTRAMUSCULAR; INTRAVENOUS at 09:07

## 2018-08-26 RX ADMIN — SODIUM CHLORIDE, POTASSIUM CHLORIDE, SODIUM LACTATE AND CALCIUM CHLORIDE 1000 ML: 600; 310; 30; 20 INJECTION, SOLUTION INTRAVENOUS at 03:18

## 2018-08-26 RX ADMIN — Medication 41.67 MILLI-UNITS/MIN: at 14:15

## 2018-08-26 RX ADMIN — FENTANYL CITRATE 100 MCG: 50 INJECTION INTRAMUSCULAR; INTRAVENOUS at 12:40

## 2018-08-26 RX ADMIN — SODIUM CHLORIDE, POTASSIUM CHLORIDE, SODIUM LACTATE AND CALCIUM CHLORIDE 1000 ML: 600; 310; 30; 20 INJECTION, SOLUTION INTRAVENOUS at 11:08

## 2018-08-26 RX ADMIN — FENTANYL CITRATE 100 MCG: 50 INJECTION INTRAMUSCULAR; INTRAVENOUS at 11:47

## 2018-08-26 RX ADMIN — FENTANYL CITRATE 100 MCG: 50 INJECTION INTRAMUSCULAR; INTRAVENOUS at 06:00

## 2018-08-26 RX ADMIN — SODIUM CHLORIDE 2.5 MILLION UNITS: 9 INJECTION, SOLUTION INTRAVENOUS at 07:33

## 2018-08-26 RX ADMIN — IBUPROFEN 800 MG: 800 TABLET, FILM COATED ORAL at 15:50

## 2018-08-26 RX ADMIN — Medication 999 ML/HR: at 16:20

## 2018-08-26 RX ADMIN — HYDROCODONE BITARTRATE AND ACETAMINOPHEN 2 TABLET: 5; 325 TABLET ORAL at 15:50

## 2018-08-26 RX ADMIN — SODIUM CHLORIDE 2.5 MILLION UNITS: 9 INJECTION, SOLUTION INTRAVENOUS at 03:59

## 2018-08-26 RX ADMIN — SODIUM CHLORIDE 2.5 MILLION UNITS: 9 INJECTION, SOLUTION INTRAVENOUS at 11:04

## 2018-08-26 ASSESSMENT — PAIN SCALES - GENERAL: PAINLEVEL_OUTOF10: 2

## 2018-08-26 NOTE — CARE PLAN
Problem: Communication  Goal: The ability to communicate needs accurately and effectively will improve  Outcome: PROGRESSING AS EXPECTED  Patient and FOB speak [place] with some english. Patient instructed that a  will be used for communication by phone/ipad, patient denies need. Patient understands to request an  at anytime, 24/7 and one will be available within minutes.     Problem: Safety  Goal: Will remain free from injury  Outcome: PROGRESSING AS EXPECTED  Patient/Family instructed to call RN with any spills, cords in the way on the floor or any other safety hazards. Patient/Family also instructed to call RN with change to LOC, feelings of dizziness, blurry vision or any change to comfort or concern for safety.   Medication administered as ordered, verified with patient/scanned in to MAR and armband on patient.     Problem: Infection  Goal: Will remain free from infection  Outcome: PROGRESSING AS EXPECTED  Ongoing observation and assessment of signs/symptoms of potential infection. Patient/family aware of importance of handwashing and available foam on the wall for use as well.    Problem: Pain Management  Goal: Pain level will decrease to patient's comfort goal  Outcome: PROGRESSING AS EXPECTED  Patient assessed for pain; ongoing. Encouraged to use non pharmaceutical options for pain such at heat pack and positioning and if not able to rest with discomfort then encouraged to request medication for pain.  Patient encouraged to call RN with change to comfort/questions/needs PRN

## 2018-08-26 NOTE — PROGRESS NOTES
Called to bedside by nurse because of concern about bleeding.  Patient got up to use the bathroom and passed multiple large clots.  When I arrived patient was resting comfortably in bed in no acute distress.  Bimanual massage was performed and multiple clots were removed.  Uterus was firm and there was no active bleeding.  0.2mg of methergine was given IM.   Pitocin was running.  Vitals wnl.   cc.   Plan for second bag of pitocin.  Stat hgb 10.1.

## 2018-08-26 NOTE — PROGRESS NOTES
1545-Patient's  pushed call light, Rowena HICKEY answered to find patient standing in the shower covered in blood, several large blood clots were on the floor in front of the shower. Patient escorted to toilet to void, kennedy care provided, and patient assisted back to bed. Notified Dr. Guerrero of patient's condition, MD to the bedside right away. Per MD order opened pitocin wide open, Dr. Guerrero administered 0.2 mg of methergine IM. Pads and clots were weighed for an estimated blood loss of 600mL. Vitals within defined parameters.   1600-Verbal order given to hang a second bag of pitocin wide open, pitocin infusing at this time. Stat Hgb order placed.

## 2018-08-26 NOTE — PROGRESS NOTES
PATIENT ID:  NAME:  Mary Santana  MRN:               [id number]  YOB: 1985    Subjective:   Pt reports feeling a little more cramping and discomfort on and off.     Objective:    Vitals:    18 1559 18 1812 18 2234 18 0232   BP: 119/71 119/68 120/70 117/59   Pulse: 77 74 75 75   Resp: 17 16     Temp: 36.8 °C (98.2 °F) 36.6 °C (97.8 °F) 36.6 °C (97.8 °F) 36.6 °C (97.9 °F)   TempSrc: Temporal Temporal Temporal Temporal   Weight:       Height:           Cervix:  3cm/30%/-3, intact membranes  [place]: Uterine Contractions Q 4 minutes.   FHRM: Baseline 130, moderate variability, Accels present, no decels       Assessment:   33 y.o. female  at 39w1d.      Plan:   1. LR for IV hydration  2. For continued induction with pitocin  3. Continuous fetal heart rate and maternal monitoring  4. Anticipate

## 2018-08-26 NOTE — DELIVERY NOTE
Vaginal Delivery Procedure Note:     Patient was admitted to Labor and Delivery at 39w1d for induction of labor due to gestational diabetes.  Mary Santana is a 33 y.o. , now Para 4023.  Induction carried out with prostaglandin cervical ripening followed by pitocin and AROM.    PreDelivery Diagnosis:  1. SIUP @ 39w1d  2. A1DM    PostDelivery Diagnosis:  1. S/p   2. A1DM    Procedure in Detail:  Pt pushing dorsal lithotomy position.  Head delivered easily over intact perineum.  Anterior shoulder followed easily with gentle downwards pressure followed by the posterior shoulder and body.  Infant delivered @ 1308.  There was no nuchal cord.  Infant was placed on the maternal abdomen and was stimulated and bulb suctioned.  Cord clamping was delayed x60 seconds then the cord was clamped x2 and cut.  Infant APGARs 8 and 9 and 1 and 5 minutes, respectively.  Birth weight 3810g.  Placenta delivered spontaneously intact at 1313. 3 Vessel cord.  The vagina and perineum were examined revealing a posterior abrasion which was noted to be hemostatic and not requiring repair.  Mild LEANNE atony was encountered and the bladder was drained with straight catheter producing approximately 75cc clear urine.  The uterus was firm with IV pitocin and fundal massage.  Pt and infant left bonding in LDR.      Anesthesia - none  Sponge and needle counts correct.  Patient tolerated procedure well.    Anticipate routine postparum care.      Breanna Segura MD  [facility], Women's Health

## 2018-08-26 NOTE — PROGRESS NOTES
L&D Progress Note    Name:   Mary Santana   Date/Time:  8/26/2018 5:23 AM  Gestational Age:  39w1d  Admit Date:   8/25/2018  Admitting Dx:     IUP@ 39W1D  Class B DM  IOL for Class B DM  GBS +  9 cm left adnexal mass    Subjective:Pt reports more painful contractions  Uterine contractions: no  Pain:    yes  Complaints:   no   Headache: .  no  Blurred vision:  no   SOB:    no   Nausea/vomiting:  no  Epigastric pain:  no  Fetal movement:  normal  Vaginal bleeding: . no    Objective:   Vitals:    08/25/18 1559 08/25/18 1812 08/25/18 2234 08/26/18 0232   BP: 119/71 119/68 120/70 117/59   Pulse: 77 74 75 75   Resp: 17 16     Temp: 36.8 °C (98.2 °F) 36.6 °C (97.8 °F) 36.6 °C (97.8 °F) 36.6 °C (97.9 °F)   TempSrc: Temporal Temporal Temporal Temporal   Weight:       Height:         Fetal heart variability: moderate. + accelerations/ no decelerations. [place] [employer] [employer] QQ2-4. Category 1.  Cervical: 75% ;  Dilatation .4-5 ; Station negative2    Fetal position:   Cephalic  Membranes ruptured: yes  AROM: Yes. Copious clear fluid  Meconium: .no    IUPC: . yes  FSE . yes    Meds:   Pitocin: .  yes    Labs:  Recent Results (from the past 72 hour(s))   POCT Fetal Nonstress Test    Collection Time: 08/23/18  2:18 PM   Result Value Ref Range    NST Indications A2GDM     NST Baseline 120s     NST Uterine Activity none     NST Acoustic Stimulation      NST Assessment Cat 1     NST Action Necessary      NST Other Data      NST Return twice weekly     NST Read By Julius    POCT Glucose    Collection Time: 08/23/18  2:42 PM   Result Value Ref Range    Glucose - Accu-Ck 117 (A) 70 - 100 mg/dL   ACCU-CHEK GLUCOSE    Collection Time: 08/25/18  8:42 AM   Result Value Ref Range    Glucose - Accu-Ck 113 (H) 65 - 99 mg/dL   Hold Blood Bank Specimen (Not Tested)    Collection Time: 08/25/18  8:55 AM   Result Value Ref Range    Holding Tube - Bb DONE    CBC WITH DIFFERENTIAL    Collection Time: 08/25/18  8:55 AM   Result Value Ref Range    WBC 9.9 4.8  - 10.8 K/uL    RBC 4.31 4.20 - 5.40 M/uL    Hemoglobin 10.9 (L) 12.0 - 16.0 g/dL    Hematocrit 34.4 (L) 37.0 - 47.0 %    MCV 79.8 (L) 81.4 - 97.8 fL    MCH 25.3 (L) 27.0 - 33.0 pg    MCHC 31.7 (L) 33.6 - 35.0 g/dL    RDW 43.5 35.9 - 50.0 fL    Platelet Count 216 164 - 446 K/uL    MPV 10.6 9.0 - 12.9 fL    Neutrophils-Polys 70.80 44.00 - 72.00 %    Lymphocytes 21.20 (L) 22.00 - 41.00 %    Monocytes 6.10 0.00 - 13.40 %    Eosinophils 0.90 0.00 - 6.90 %    Basophils 0.50 0.00 - 1.80 %    Immature Granulocytes 0.50 0.00 - 0.90 %    Nucleated RBC 0.00 /100 WBC    Neutrophils (Absolute) 7.01 2.00 - 7.15 K/uL    Lymphs (Absolute) 2.10 1.00 - 4.80 K/uL    Monos (Absolute) 0.60 0.00 - 0.85 K/uL    Eos (Absolute) 0.09 0.00 - 0.51 K/uL    Baso (Absolute) 0.05 0.00 - 0.12 K/uL    Immature Granulocytes (abs) 0.05 0.00 - 0.11 K/uL    NRBC (Absolute) 0.00 K/uL   ACCU-CHEK GLUCOSE    Collection Time: 08/25/18 12:11 PM   Result Value Ref Range    Glucose - Accu-Ck 91 65 - 99 mg/dL   ACCU-CHEK GLUCOSE    Collection Time: 08/25/18  3:56 PM   Result Value Ref Range    Glucose - Accu-Ck 143 (H) 65 - 99 mg/dL   ACCU-CHEK GLUCOSE    Collection Time: 08/25/18  7:13 PM   Result Value Ref Range    Glucose - Accu-Ck 72 65 - 99 mg/dL   ACCU-CHEK GLUCOSE    Collection Time: 08/26/18  3:13 AM   Result Value Ref Range    Glucose - Accu-Ck 92 65 - 99 mg/dL         Assessment:   Gestational Age: 39w1d  Risk Factors:Class B DM (no meds)  Labor State: Active phase labor.  GBS + on antibiotics  S/P amniotomy with IUPC/ISE placement  Category 1 FHR tracing  9 cm left adnexal mass      Plan: Continue present management  Plan of care discussed    Patient Active Problem List    Diagnosis Date Noted   • Group B Streptococcus carrier, +RV culture, currently pregnant 08/07/2018   • Polyhydramnios in third trimester - ELOY 20cm 8/14/18 07/05/2018   • Diabetes mellitus affecting pregnancy in third trimester 06/02/2018   • Ovarian mass, left - 8.9cm --> needs  removal 6wks PP for financial reasons 05/29/2018   • High risk multigravida, third trimester 04/30/2018       Dmitri Franco M.D.

## 2018-08-26 NOTE — PROGRESS NOTES
"PATIENT ID:  NAME:  Mary Santana  MRN:               [id number]  YOB: 1985    Subjective:   Pt reports some light menstrual cramps and some pressure in pelvis.     Objective:    Vitals:    18 0813 18 1332 18 1559 18 1812   BP:  120/68 119/71 119/68   Pulse:  82 77 74   Resp:  18 17 16   Temp:  36.6 °C (97.9 °F) 36.8 °C (98.2 °F) 36.6 °C (97.8 °F)   TempSrc:  Temporal Temporal Temporal   Weight: 99.8 kg (220 lb)      Height: 1.549 m (5' 1\")          Cervix:  FT external Os not able to reach internal os/L/floating, intact membranes   [place]: Uterine Contractions show uterine irritability.   FHRM: Baseline 120, moderate variability, Accels present, no decels   Pitocin: 8      Assessment:   33 y.o. female  at 39w0d.      Plan:   1. LR for IV hydration  2. Continued Pen G for GBS prophylaxis   3. Pitocin to be discontinued and will try gel for cervical ripening at this time  4. Continuous fetal heart rate and maternal monitoring  5. Anticipate       "

## 2018-08-26 NOTE — PROGRESS NOTES
Brief Progress Note:    Pt reports increased pressure w/ ctx.      Vitals:    18 2234 18 0232 18 0803 18 0914   BP: 120/70 117/59 111/65 136/84   Pulse: 75 75 83 80   Resp:   16 16   Temp: 36.6 °C (97.8 °F) 36.6 °C (97.9 °F) 36.7 °C (98.1 °F) 37.1 °C (98.8 °F)   TempSrc: Temporal Temporal Temporal Temporal   Weight:       Height:           SVE: 6-7/70/-2    EFW 7.5lbs    125/mod yao/+accels/no decels  q3-4min (MVUs 150-180)    A/P: 32yo  @ 39w1d w/ GDM here for IOL  - IOL, not ruptured and on pitocin with IUPC in place, cont to increase.  MVUs currently inadequate, pit currently @ 14mUnits/min.  - GBS + on PCN  - GDM: briefly on insulin however then hypoglycemic so primarily diet controlled.  BGs normal here  - FHT: cat I  - declines epidural, fentanyl PRN      Breanna Segura MD  [facility], Women's Health

## 2018-08-26 NOTE — CARE PLAN
Problem: Knowledge Deficit  Goal: Patient/Support person demonstrates understanding regarding the progression of labor, available options and participates in decision-making process  Outcome: PROGRESSING AS EXPECTED  Pt educated on POC and all questions answered. Pt encouraged to continue to ask questions regarding her labor process as they arise.     Problem: Risk for Infection, Impaired Wound Healing  Goal: Remain free from signs and symptoms of infection  Outcome: PROGRESSING AS EXPECTED  Pt educated on s/s of infection and her increased risk now that she is ruptured. Pt verbalized understanding.

## 2018-08-26 NOTE — PROGRESS NOTES
1900 Report received from Melissa UMANZOR, POC discussed    2130 RN and Darcy Long at bedside, SVE no change. Will consult with Dr. Franco on POC.     2135 POC to stop pitocin and place prosteglandin gel. Orders placed. Okay for pt to have a GDM tray, order placed.     0225 RN and Darcy RUEDA at bedside, SVE 3/30/-3. Orders to restart pitocin received.     0558 RN at bedside, pt requesting something for pain. Medicated per MAR.     0655 Report given to Leslie UMANZOR, POC discussed

## 2018-08-26 NOTE — PROGRESS NOTES
"0700 - Report received from Selma MANCINI RN  at , care assumed. Mcdonald Gestation- 39.1 Wks      Patient is in bed awake - working through contractions with good control.   Patient and FOB speak Swedish with some english, discussed using an . Patient denies need, agrees to request an  as needed. RN assured patient that an  is available within minutes by phone, .   Answers questions with limited discussion. Plan use of  with complicated/extended discussion or consenting.    FOB \"Florentine\" at  - patient is not expecting more visitors today, prior to delivery. Patient would like the FOB at  during the pushing and delivery phase. FOB to cut the cord. Patient would like immediate skin/skin.    Reports little sleep last night, denies ill feeling, reports FM, clear fluid noted on the linen; AROM at 0515 am today. denies bleeding, No change to vision/edema/HA; states she has been getting up to the BR herself without assist, denies dizziness or weakness.     Use of FM/Loda discussed and in place, discussed POC, sleepy affect/mood. Brief review of labor process/expectations, breathing/relaxation techniques - TBD as needed, RN notes patient currently using a rythmic breathing/relaxation technique effectively. Discussed pain management options - patient is planning to request IV medication and is not interested in an epidural. Reports she gave birth to her previous 3 children without an epidural as well.      Patient/FOB deny questions/concerns regarding care since arrival to [facility]. RN contact information updated on the dry erase board, reviewed. Patient encouraged to call RN with all questions/concerns/needs.    1308 -  of Male by Dr. Segura. Patricia ANGELO RN at  to care for NB. NB placed directly to abdomen per mothers request, FOB cut the cord. Water at . Food ordered.     1430 - Patient assisted to the BR. +void and use of the peribottle. Patient denies " questions/concerns regarding care or delivery. Discussed expectations during transport to PPU and care while in PPU. Patient to PPU room 350 in w/c holding NB with FOB and RN at side. Oanh RN in room on arrival. Report given.

## 2018-08-26 NOTE — PROGRESS NOTES
Patient arrived via wheelchair with belongings to S350. Report received from Fernando RONDON RN. Patient oriented to room, call light/skylight & infant security. Assessment done fundus firm, lochia light, and vital signs within defined parameters.  IV patent and infusing pitocin at 125 mL/Hr per MD order. Discussed with patient pain medication plan, patient will call for pain medications as needed. Reviewed plan of care with patient, encouraged to call with needs, patient verbalized understanding. Hourly rounding implemented, call light within reach.

## 2018-08-27 PROCEDURE — 770002 HCHG ROOM/CARE - OB PRIVATE (112)

## 2018-08-27 PROCEDURE — 700102 HCHG RX REV CODE 250 W/ 637 OVERRIDE(OP): Performed by: STUDENT IN AN ORGANIZED HEALTH CARE EDUCATION/TRAINING PROGRAM

## 2018-08-27 PROCEDURE — A9270 NON-COVERED ITEM OR SERVICE: HCPCS | Performed by: STUDENT IN AN ORGANIZED HEALTH CARE EDUCATION/TRAINING PROGRAM

## 2018-08-27 RX ADMIN — Medication 1 TABLET: at 08:45

## 2018-08-27 RX ADMIN — IBUPROFEN 800 MG: 800 TABLET, FILM COATED ORAL at 16:50

## 2018-08-27 RX ADMIN — IBUPROFEN 800 MG: 800 TABLET, FILM COATED ORAL at 08:38

## 2018-08-27 ASSESSMENT — PAIN SCALES - GENERAL
PAINLEVEL_OUTOF10: 0
PAINLEVEL_OUTOF10: 0
PAINLEVEL_OUTOF10: 3
PAINLEVEL_OUTOF10: 2
PAINLEVEL_OUTOF10: 0
PAINLEVEL_OUTOF10: 0

## 2018-08-27 NOTE — PROGRESS NOTES
Assumed care. Assessment completed. Fundus firm, lochia light. Stand by assist patient to restroom, voided without difficulty. Leann care completed. In Sinhala, POC and discharge education reviewed, all questions answered, verbalized understanding. Declines pain, will call if pain meds needed.

## 2018-08-27 NOTE — PROGRESS NOTES
Report received at 0700. Assessment completed: VSS. Patient ambulates by self. Fundus firm, lochia scant. Patient states pain 2/10; medicated per MAR. Patient would like medication offered when available. Bed in lowest locked position. Call light in place. All questions and concerns addressed. Will continue to monitor.

## 2018-08-27 NOTE — PROGRESS NOTES
Mother concerned she has no milk, education provided on onset of milk production, taught breast massage and hand expression with colostrum easily expressed, encouraged exclusive breastfeeding. Mother denies pain/discomfort when feeding and declines need for assistance with latch at this time. Plans to follow with [facility] for ongoing support. Polish language line used for consult.

## 2018-08-27 NOTE — CARE PLAN
Problem: Potential for postpartum infection related to presence of episiotomy/vaginal tear and/or uterine contamination  Goal: Patient will be absent from signs and symptoms of infection  Outcome: PROGRESSING AS EXPECTED  VSS. No signs or symptoms of infection noted or reported.     Problem: Alteration in comfort related to episiotomy, vaginal repair and/or after birth pains  Goal: Patient is able to ambulate, care for self and infant  Outcome: PROGRESSING AS EXPECTED  Patient ambulating, caring for self and infant

## 2018-08-27 NOTE — PROGRESS NOTES
"PostPartum day #1:    Subjective:   Pt reports feeling well, pain is tolerable with pain medication.  Pt is ambulating, has voided spontaneously.  Is tolerating PO.  Reports her bleeding is minimal.    Breast feeding.yes, reports this is going OK.      Blood pressure 103/67, pulse 67, temperature 36.5 °C (97.7 °F), resp. rate 17, height 1.549 m (5' 1\"), weight 99.8 kg (220 lb), last menstrual period 11/25/2017, SpO2 95 %, currently breastfeeding.    Intake/Output Summary (Last 24 hours) at 08/27/18 0715  Last data filed at 08/26/18 1710   Gross per 24 hour   Intake             2000 ml   Output                0 ml   Net             2000 ml     gen: AAO, NAD  Abd: soft, ND, nontender; fundus palpable below umbilcus  Ext: NT, tr edema bilaterally    Meds:  Current Facility-Administered Medications:   •  ondansetron (ZOFRAN ODT) dispertab 4 mg, 4 mg, Oral, Q6HRS PRN **OR** ondansetron (ZOFRAN) syringe/vial injection 4 mg, 4 mg, Intravenous, Q6HRS PRN, Maggie Guerrero M.D.  •  oxytocin (PITOCIN) infusion (for postpartum), 2,000 mL/hr, Intravenous, Once **FOLLOWED BY** oxytocin (PITOCIN) infusion (for postpartum),  mL/hr, Intravenous, Continuous, Maggie Guerrero M.D., Stopped at 08/26/18 1620  •  ibuprofen (MOTRIN) tablet 600 mg, 600 mg, Oral, Q6HRS PRN, Maggie Guerrero M.D.  •  acetaminophen (TYLENOL) tablet 325 mg, 325 mg, Oral, Q4HRS PRN, Maggie Guerrero M.D.  •  HYDROcodone-acetaminophen (NORCO) 5-325 MG per tablet 1 Tab, 1 Tab, Oral, Q4HRS PRN, Maggie Guerrero M.D.  •  HYDROcodone-acetaminophen (NORCO) 5-325 MG per tablet 2 Tab, 2 Tab, Oral, Q4HRS PRN, Maggie Guerrero M.D., 2 Tab at 08/26/18 1550  •  oxytocin (PITOCIN) infusion (for postpartum),  mL/hr, Intravenous, Continuous, Maggie Guerrero M.D., Stopped at 08/26/18 1630  •  ibuprofen (MOTRIN) tablet 800 mg, 800 mg, Oral, Q8HRS PRN, Maggie Guerrero M.D., 800 mg at 08/26/18 1550  •  ondansetron (ZOFRAN) syringe/vial injection 4 mg, 4 mg, " Intravenous, Q6HRS PRN, Maggie Guerrero M.D.  •  oxyCODONE-acetaminophen (PERCOCET) 5-325 MG per tablet 2 Tab, 2 Tab, Oral, Q4HRS PRN, Maggie Guerrero M.D.  •  docusate sodium (COLACE) capsule 100 mg, 100 mg, Oral, BID PRN, Maggie Guerrero M.D.  •  magnesium hydroxide (MILK OF MAGNESIA) suspension 30 mL, 30 mL, Oral, PRN, Maggie Guerrero M.D.  •  prenatal plus vitamin (STUARTNATAL 1+1) 27-1 MG tablet 1 Tab, 1 Tab, Oral, QAM, Maggie Guerrero M.D., Stopped at 18 0600      Lab:   Results for REX MELLO (MRN [id number]) as of 2018 07:15   Ref. Range 2018 16:20 2018 20:44   Hemoglobin Latest Ref Range: 12.0 - 16.0 g/dL 10.1 (L) 9.5 (L)   Hematocrit Latest Ref Range: 37.0 - 47.0 % 31.3 (L) 29.9 (L)       A/P: 33 y.o.  PPD 1 s/p  @ 39w2d.  - Doing well postpartum, no signs of complications;  - infant: doing well at bedside, pt is BFing; lactation ot see today  - Rh+/RI  - contraception: desires nexplanon, considering DMPA prior to discharge    dispo: anticipate discharge PPD 2, infant GBS +      Breanna Segura MD  [facility], Women's Health

## 2018-08-28 VITALS
OXYGEN SATURATION: 96 % | HEART RATE: 75 BPM | BODY MASS INDEX: 41.54 KG/M2 | RESPIRATION RATE: 18 BRPM | HEIGHT: 61 IN | WEIGHT: 220 LBS | DIASTOLIC BLOOD PRESSURE: 65 MMHG | TEMPERATURE: 98.4 F | SYSTOLIC BLOOD PRESSURE: 95 MMHG

## 2018-08-28 PROBLEM — O40.3XX0 POLYHYDRAMNIOS IN THIRD TRIMESTER: Status: RESOLVED | Noted: 2018-07-05 | Resolved: 2018-08-28

## 2018-08-28 PROBLEM — O09.43 HIGH RISK MULTIGRAVIDA, THIRD TRIMESTER: Status: RESOLVED | Noted: 2018-04-30 | Resolved: 2018-08-28

## 2018-08-28 PROBLEM — O99.820 GROUP B STREPTOCOCCUS CARRIER, +RV CULTURE, CURRENTLY PREGNANT: Status: RESOLVED | Noted: 2018-08-07 | Resolved: 2018-08-28

## 2018-08-28 PROBLEM — O24.913 DIABETES MELLITUS AFFECTING PREGNANCY IN THIRD TRIMESTER: Status: RESOLVED | Noted: 2018-06-02 | Resolved: 2018-08-28

## 2018-08-28 PROCEDURE — 700102 HCHG RX REV CODE 250 W/ 637 OVERRIDE(OP): Performed by: STUDENT IN AN ORGANIZED HEALTH CARE EDUCATION/TRAINING PROGRAM

## 2018-08-28 PROCEDURE — A9270 NON-COVERED ITEM OR SERVICE: HCPCS | Performed by: STUDENT IN AN ORGANIZED HEALTH CARE EDUCATION/TRAINING PROGRAM

## 2018-08-28 RX ORDER — MEDROXYPROGESTERONE ACETATE 150 MG/ML
150 INJECTION, SUSPENSION INTRAMUSCULAR ONCE
Status: DISCONTINUED | OUTPATIENT
Start: 2018-08-28 | End: 2018-08-28 | Stop reason: HOSPADM

## 2018-08-28 RX ORDER — VITAMIN A ACETATE, BETA CAROTENE, ASCORBIC ACID, CHOLECALCIFEROL, .ALPHA.-TOCOPHEROL ACETATE, DL-, THIAMINE MONONITRATE, RIBOFLAVIN, NIACINAMIDE, PYRIDOXINE HYDROCHLORIDE, FOLIC ACID, CYANOCOBALAMIN, CALCIUM CARBONATE, FERROUS FUMARATE, ZINC OXIDE, CUPRIC OXIDE 3080; 12; 120; 400; 1; 1.84; 3; 20; 22; 920; 25; 200; 27; 10; 2 [IU]/1; UG/1; MG/1; [IU]/1; MG/1; MG/1; MG/1; MG/1; MG/1; [IU]/1; MG/1; MG/1; MG/1; MG/1; MG/1
1 TABLET, FILM COATED ORAL EVERY MORNING
Qty: 60 TAB | Refills: 1 | Status: SHIPPED | OUTPATIENT
Start: 2018-08-28

## 2018-08-28 RX ORDER — IBUPROFEN 600 MG/1
600 TABLET ORAL EVERY 6 HOURS PRN
Qty: 30 TAB | Refills: 0 | Status: SHIPPED | OUTPATIENT
Start: 2018-08-28

## 2018-08-28 RX ORDER — LANOLIN ALCOHOL/MO/W.PET/CERES
325 CREAM (GRAM) TOPICAL
Qty: 120 TAB | Refills: 1 | Status: SHIPPED | OUTPATIENT
Start: 2018-08-28

## 2018-08-28 RX ORDER — PSEUDOEPHEDRINE HCL 30 MG
100 TABLET ORAL 2 TIMES DAILY PRN
Qty: 60 CAP | Refills: 1 | Status: SHIPPED | OUTPATIENT
Start: 2018-08-28

## 2018-08-28 RX ADMIN — Medication 1 TABLET: at 08:00

## 2018-08-28 RX ADMIN — IBUPROFEN 800 MG: 800 TABLET, FILM COATED ORAL at 04:30

## 2018-08-28 ASSESSMENT — PAIN SCALES - GENERAL
PAINLEVEL_OUTOF10: 0
PAINLEVEL_OUTOF10: 5
PAINLEVEL_OUTOF10: 0

## 2018-08-28 ASSESSMENT — PATIENT HEALTH QUESTIONNAIRE - PHQ9
SUM OF ALL RESPONSES TO PHQ9 QUESTIONS 1 AND 2: 0
2. FEELING DOWN, DEPRESSED, IRRITABLE, OR HOPELESS: NOT AT ALL
1. LITTLE INTEREST OR PLEASURE IN DOING THINGS: NOT AT ALL

## 2018-08-28 NOTE — CARE PLAN
Problem: Alteration in comfort related to episiotomy, vaginal repair and/or after birth pains  Goal: Patient verbalizes acceptable pain level  Outcome: PROGRESSING AS EXPECTED  Plan of care made at start of shift to manage pain.     Problem: Potential knowledge deficit related to lack of understanding of self and  care  Goal: Patient will verbalize understanding of self and infant care  Outcome: PROGRESSING AS EXPECTED  Discharge education reviewed with night shift RN in Persian. Patient verbalizes understanding.

## 2018-08-28 NOTE — PROGRESS NOTES
Car seat checked and verified by this RN. Infant and parents escorted off unit by EDELMIRA Amaro.

## 2018-08-28 NOTE — PROGRESS NOTES
0700 - Bedside report received from NOÉ Matthews. Patient care assumed. Chart and orders reviewed  0750 - Pt assessment complete, wnl. Fundus firm with minimal discharge. Pt ambulating to bathroom and voiding without difficulty. Patient denies any dizziness or lightheadedness at this time. Patient denies any calf pain or tenderness at this time. Plan of care discussed with patient for the day including infant feeding every 2-3 hours or on demand, pain management, and ambulation in halls. Pain medication plan discussed with patient; patient states she will call if PRN pain medication is wanted. All questions/concerns addressed at this time. Call light within reach, encouraged to call with needs. Will continue with routine postpartum cares.

## 2018-08-28 NOTE — PROGRESS NOTES
Using  services ID #[id number], Kenneth discussed with patient that if the doctor has ordered the Depo-Provera shot for her as a form of birth control. Explained to mom that if she receives it before 6 weeks postpartum, she may experience a low milk supply. Patient does not want to receive the Depo shot today and will get it at her 6 week follow up appointment.

## 2018-08-28 NOTE — CARE PLAN
Problem: Alteration in comfort related to episiotomy, vaginal repair and/or after birth pains  Goal: Patient verbalizes acceptable pain level  Outcome: PROGRESSING AS EXPECTED  Patient has verbalized acceptable pain level 1-4/10. Pain currently being managed with PRN medication orders. Will continue to monitor with Q6 hour checks and patient rounding.     Problem: Potential knowledge deficit related to lack of understanding of self and  care  Goal: Patient will demonstrate ability to care for self and infant  Outcome: PROGRESSING AS EXPECTED  Patient has demonstrated that she is able to ambulate and care for self and infant independently. Pain relief is met with PRN medication orders. Will continue to monitor with Q6 hour checks and patient rounding.

## 2018-08-28 NOTE — CARE PLAN
Problem: Altered physiologic condition related to immediate post-delivery state and potential for bleeding/hemorrhage  Goal: Patient physiologically stable as evidenced by normal lochia, palpable uterine involution and vital signs within normal limits  Outcome: PROGRESSING AS EXPECTED  Fundus firm @ U, lochia rubra minimal. V/S stable @ this time.     Problem: Alteration in comfort related to episiotomy, vaginal repair and/or after birth pains  Goal: Patient verbalizes acceptable pain level  Outcome: PROGRESSING AS EXPECTED  Patient verbalized acceptable pain level @ this time. Will be medicated as needed.

## 2018-08-28 NOTE — DISCHARGE INSTRUCTIONS
DISCHARGE INSTRUCTIONS ([place]) POSTPARTUM FOR MOM      KOBY LAS NAY:  · Antes de tocar el bebé.  · Antes del amamantamiento o darle al bebé lactancia artificial.  · Después de usar el baño.    CUIDADO DE LAS HERIDAS:  · La Incisión de la Operación Cesárea:  Mantenga limpea y seca, NO levante nada que pesa más que rodriguez bebé por 6 semenas.  No debe ninguna apertura ni pus.  · Episiotomía/Lali Vaginal:  Use un spray de Tucks o Dermoplast, tome un baño de asiento cuando necesite (6 pulgadas), siga usando la botella Leann hasta que pare de sangrar.    CUIDADA DEL SENO:  · Lleve un sostén.  · Si esta` amamantando no use jabón en el seno ni en los pezones.  · Aplique lanolina si los pezones se ponen quebrazados y si le duelen.    CUIDADO DE LA VAGINA:  · [place] puede entrar la vagina por 6 semanas:  Actividad sexual, Ducha vaginal, Tampones.  · El sangramiento puede seguir por 2 a 4 semanas.  La cantidad es variable.  Llame a rodriguez med`ico(a) obstetra si hay mucha juan carlos (si usa ma`s de kelsea toalla femenina cada hora).    CONTRACEPCIÒN:  · Es posible embarazarse en cualquier tiempo despus del parto y mientras el amamantamiento.  · Debe planear de discutir los metodos de cantracepción con rodriguez médico(a) cuando vuelva en 6 semanas para rodriguez revisión médica.    DIETA Y DEFECACÒN:  · Para evitar la constipación coma mas fibra (cereal salvado, fruta, y verduras) Y tome muchos liquidos.   · Es común orinar frecuentemente después del parto.    POSTPARTO Y LA DEPRESÒN:  David los primeros andrade después del parto es común sentirse:  · Impaciente, irritable, o llorar  Estos sentimientos llegan y van rapidamente.  No obstante, vitor kelsea de cada sandra mujeres tiene síntomas emocionales conocidos fadi depresión postparto.  · Despresión Postparto:  Puede empezar tan pronto fadi el lottie o tercer día después del parto o puede durar algunas semanas o meses para desarrollar.  Síntomas de la depresión pueden presentarse, jing son más  intensos:  · Pérdida del hambre  · Frecuencia de llorar  · Sentirse desesperada o perder el control  · Demasiada o no suficiente preocupación con rodriguez bebé  · Tener miedo de tocar el bebé  · No preocuparse con rodriguez propia apariencia  · Incapacidad de dormir o dormir excesivamente    DEPRESIÒN / RIESGO AL SUICIDIO:    Cuando se le da de marck de alguna entidad de [facility], es importante aprender a mantener a lanie de hacerse daño a sí mismo.    Reconocer los signos de advertencia:  · Cambios bruscos en la peronalidad, positiva o negativa, incluyendo aumento de la energia  · Regalar posesiones  · Cambios en patrones de comer - significativos cambios de peso - positivos o negativos  · Cambio de patrones para dormir - no poder dormir o dormir todo el tiempo  · Falta de voluntad o incapacidad de comunicarse  · Depresión  · Nataly, desánimo y tristeza inusual  · Habla de querer morir  · Descuido del aspecto personal  · Rebeldía - comportamiento imprudente  · Retiro de personas y actividades que les gusta  · Confusión-incapacidad para concentrarse    Si usted o un ser querido observa cualquiera de estos comportamientos o tiene preocupaciones de hacerse daño a si mismo, aquí le damos lo que usted puede hacer:  · Hablar de ti - tus sentimientos y razones para hacerse juliette a si mismo  · Retire cualquier medio que se podría utilizar para lastimarse (ejemplos: píldoras, cuerdas, cordones de extensión, arma de beatris)  · Obtenga ayuda profesional de la comunidad (anmol mental, abuso de sustancias, orientación psicológica)  · No estar solo: llamar a un contacto seguro - kelsea persona que confía en que estará allí por usted  · Llamada local LINEA de CRISIS [phone] y [phone]  · Llamada local [employer] [facility] [place] [phone] or www.[employer].com  · Llamada gratuita nacional, líneas de prevención del suicidio  · Preventión del Suicidio Nacional [facility] [phone]  ([phone])  · [place] 800-SUICIDE ([phone])       ([employer])

## 2018-08-28 NOTE — PROGRESS NOTES
Discharge education reviewed with pt via language line  Marilee #[id number]. Follow up instructions and prescriptions given. Pt verbalized understanding.

## 2018-08-28 NOTE — DISCHARGE SUMMARY
Discharge Summary:      Mary Santana      Admit Date:   2018  Discharge Date:  2018     Admitting diagnosis:  Pregnancy  Labor and delivery, indication for care  Discharge Diagnosis: Status post vaginal, spontaneous.  Pregnancy Complications: gestational diabetes  Tubal Ligation:  no        History:  Past Medical History:   Diagnosis Date   • Cholelithiasis 2010   • Hypertension 2015    Pt states was taking Lisinopril 10 mg but stopped taking on her own more than a year ago.      OB History    Para Term  AB Living   6 3 3   2 3   SAB TAB Ectopic Molar Multiple Live Births   2         3      # Outcome Date GA Lbr Akshat/2nd Weight Sex Delivery Anes PTL Lv   6             5 Term 11/28/10 40w0d  3.175 kg (7 lb) F Vag-Spont None N DEVIN      Birth Comments: Pt states was induced, due to gallbladder pains.    4 Term 09 40w0d  3.374 kg (7 lb 7 oz) F Vag-Spont None  DEVIN   3 SAB 07 11w0d    SAB         Birth Comments: D&C done 1 day procedure   2 Term 05 41w0d  3.969 kg (8 lb 12 oz) M Vag-Spont None  DEVIN   1 SAB  11w0d    SAB  N       Birth Comments: d&c  done 1 day procedure , nop cmplications           Nkda [no known drug allergy]  Patient Active Problem List    Diagnosis Date Noted   •  (normal spontaneous vaginal delivery) 2018   • Ovarian mass, left - 8.9cm --> needs removal 6wks PP for financial reasons 2018        Hospital Course:   33 y.o. , now para 4, was admitted with the above mentioned diagnosis, underwent Induction of Labor, vaginal, spontaneous. Patient postpartum course was unremarkable, with progressive advancement in diet , ambulation and toleration of oral analgesia. Patient without complaints today and desires discharge.      Vitals:    18 0400 18 0840 18 2000 18 0800   BP: 103/67 (!) 99/61 108/61 (!) 95/65   Pulse: 67 93 86 75   Resp:  17 18   Temp: 36.5 °C (97.7 °F) 36.6 °C (97.9 °F)  36.2 °C (97.1 °F) 36.9 °C (98.4 °F)   TempSrc:       SpO2: 95% 96% 96% 96%   Weight:       Height:           Current Facility-Administered Medications   Medication Dose   • medroxyPROGESTERone (DEPO-PROVERA) injection 150 mg  150 mg   • ondansetron (ZOFRAN ODT) dispertab 4 mg  4 mg    Or   • ondansetron (ZOFRAN) syringe/vial injection 4 mg  4 mg   • oxytocin (PITOCIN) infusion (for postpartum)   mL/hr   • ibuprofen (MOTRIN) tablet 600 mg  600 mg   • acetaminophen (TYLENOL) tablet 325 mg  325 mg   • HYDROcodone-acetaminophen (NORCO) 5-325 MG per tablet 1 Tab  1 Tab   • HYDROcodone-acetaminophen (NORCO) 5-325 MG per tablet 2 Tab  2 Tab   • oxytocin (PITOCIN) infusion (for postpartum)   mL/hr   • ibuprofen (MOTRIN) tablet 800 mg  800 mg   • ondansetron (ZOFRAN) syringe/vial injection 4 mg  4 mg   • oxyCODONE-acetaminophen (PERCOCET) 5-325 MG per tablet 2 Tab  2 Tab   • docusate sodium (COLACE) capsule 100 mg  100 mg   • magnesium hydroxide (MILK OF MAGNESIA) suspension 30 mL  30 mL   • prenatal plus vitamin (STUARTNATAL 1+1) 27-1 MG tablet 1 Tab  1 Tab       Exam:  Breast Exam: negative  Abdomen: Abdomen soft, non-tender. BS normal. No masses,  No organomegaly, negative findings: no scars, striae, dilated veins, rashes, or lesions, bowel sounds normal  Fundus Non Tender: yes  Incision: none  Perineum: perineum intact  Extremity: extremities, peripheral pulses and reflexes normal, no edema, redness or tenderness in the calves or thighs, Homans sign is negative, no sign of DVT     Labs:  Recent Labs      08/25/18   0855  08/26/18   1620  08/26/18   2044   WBC  9.9  13.9*  14.1*   RBC  4.31  4.04*  3.83*   HEMOGLOBIN  10.9*  10.1*  9.5*   HEMATOCRIT  34.4*  31.3*  29.9*   MCV  79.8*  77.5*  78.1*   MCH  25.3*  25.0*  24.8*   MCHC  31.7*  32.3*  31.8*   RDW  43.5  42.3  42.4   PLATELETCT  216  193  217   MPV  10.6  10.2  10.2        Activity:   Discharge to home  Pelvic Rest x 6 weeks    Assessment:  normal  postpartum course and good candidate for Depo-Provera injections  Discharge Assessment: No heavy bleeding or foul vaginal discharge, no breast tenderness, voiding without dificulty     Follow up: [facility] or [facility] in 5 weeks for vaginal    Discharge Meds:   Current Outpatient Prescriptions   Medication Sig Dispense Refill   • docusate sodium 100 MG Cap Take 100 mg by mouth 2 times a day as needed for Constipation. 60 Cap 1   • ibuprofen (MOTRIN) 600 MG Tab Take 1 Tab by mouth every 6 hours as needed (For cramping after delivery; do not give if patient is receiving ketorolac (Toradol)). 30 Tab 0   • prenatal plus vitamin (STUARTNATAL 1+1) 27-1 MG Tab tablet Take 1 Tab by mouth every morning. 60 Tab 1   • ferrous sulfate 325 (65 Fe) MG EC tablet Take 1 Tab by mouth 3 times a day, with meals. 120 Tab 1     Depo-provera for contraception prior to discharge.     KRISTINA Caldwell, NAZANIN Bustamante as attending

## 2018-08-28 NOTE — CONSULTS
"Ipad  # [id number] used.  Mother denies pain with breastfeeding, nipples intact.  Discussed feeding at breast when baby shows hunger cues. Encouraged to give small amounts of formula infrequently in the first weeks to ensure plentiful milk production. Discussed stooling pattern norms for first 5 days,  She reports she  her first baby and had plentiful milk. She did not breastfeed other babies because \"they did not want it\".  She plans to followup with WIC. Discussed calling WIC if she has problems with engorgement or sore nipples post d/c.   "

## 2018-10-04 ENCOUNTER — POST PARTUM (OUTPATIENT)
Dept: OBGYN | Facility: CLINIC | Age: 33
End: 2018-10-04

## 2018-10-04 VITALS — BODY MASS INDEX: 39.3 KG/M2 | SYSTOLIC BLOOD PRESSURE: 130 MMHG | WEIGHT: 208 LBS | DIASTOLIC BLOOD PRESSURE: 78 MMHG

## 2018-10-04 DIAGNOSIS — N83.8 OVARIAN MASS, LEFT: ICD-10-CM

## 2018-10-04 PROCEDURE — 90050 PR POSTPARTUM VISIT: CPT | Performed by: PHYSICIAN ASSISTANT

## 2018-10-04 NOTE — PROGRESS NOTES
Pt here today for postpartum exam, pt delivered on 8/26/18  Currently both breast and bottle feeding.   BCM: pt like to discuss options, [facility].   Good ph: [phone]  Pap smear done 4/30/2018 WNL  Pt states no complaints.

## 2018-10-04 NOTE — PROGRESS NOTES
Subjective:      Mary Santana is a 33 y.o. female who presents with Postpartum visit today. 5wk s/p  at term without complications. Pt has no complaints- denies heavy vaginal bleeding, depression, intercourse, pain or problems with BF. PAP wnl  - repeat . BCM desired is IUD or Nexplanon, so will call [facility] asap. Pt declines BCP or Depo due to compilcations in past. Pt urged to use condoms every time or stay abstinent until IUD placed.     Pt was told she had 8-9cm ovarian mass and needed to have it checked out and possibly removed PP, but last US in  specifically states no ovarian masses, though  the 9cm mass seen. Pt urged to f/u for both 2hr GTT testing and US for ovarian mass by 3 mo PP. Pt agrees and will call [facility] today. Call here if difficulty with appts or payments.             HPI    ROS       Objective:     /78   Wt 94.3 kg (208 lb)   LMP 2017   BMI 39.30 kg/m²      Physical Exam   Constitutional: She appears well-developed and well-nourished.   HENT:   Head: Normocephalic and atraumatic.   Eyes: Pupils are equal, round, and reactive to light.   Neck: Normal range of motion. Neck supple. No thyromegaly present.   Cardiovascular: Normal rate, regular rhythm and normal heart sounds.    Pulmonary/Chest: Effort normal and breath sounds normal. No respiratory distress.   Abdominal: Soft. Bowel sounds are normal. She exhibits no distension. There is no tenderness.   Genitourinary: Vagina normal and uterus normal. Pelvic exam was performed with patient supine. There is no rash or tenderness on the right labia. There is no rash or tenderness on the left labia. Uterus is not deviated, not enlarged and not tender. Cervix exhibits no motion tenderness. Right adnexum displays no mass and no tenderness. Left adnexum displays no mass and no tenderness. No erythema in the vagina. No foreign body in the vagina. No signs of injury around the vagina. No vaginal discharge found.    Neurological: She is alert. She has normal reflexes.   Skin: Skin is warm and dry. No erythema.   Psychiatric: She has a normal mood and affect. Her behavior is normal. Thought content normal.   Vitals reviewed.              Assessment/Plan:     1. Ovarian mass, left  - No mass seen on last US in 8/18, but still recommend f/u for US and possible removal - pt to call [facility] or [facility] or [facility]    2. Postpartum care following vaginal delivery  - Pt to f/u at [facility] for IUD, though will use condoms in meantime  - GDM during pregnancy, so also recommend 2hr GTT 12 wks after delivery  - PAP wnl 4/18 - repeat 4/21

## 2020-03-07 ENCOUNTER — HOSPITAL ENCOUNTER (EMERGENCY)
Facility: MEDICAL CENTER | Age: 35
End: 2020-03-07
Attending: EMERGENCY MEDICINE

## 2020-03-07 ENCOUNTER — APPOINTMENT (OUTPATIENT)
Dept: RADIOLOGY | Facility: MEDICAL CENTER | Age: 35
End: 2020-03-07
Attending: EMERGENCY MEDICINE

## 2020-03-07 VITALS
DIASTOLIC BLOOD PRESSURE: 65 MMHG | BODY MASS INDEX: 35.5 KG/M2 | TEMPERATURE: 97.5 F | HEART RATE: 72 BPM | SYSTOLIC BLOOD PRESSURE: 126 MMHG | OXYGEN SATURATION: 98 % | RESPIRATION RATE: 18 BRPM | WEIGHT: 200.4 LBS

## 2020-03-07 VITALS
SYSTOLIC BLOOD PRESSURE: 156 MMHG | HEIGHT: 63 IN | HEART RATE: 83 BPM | OXYGEN SATURATION: 99 % | WEIGHT: 203.48 LBS | RESPIRATION RATE: 16 BRPM | TEMPERATURE: 98.4 F | BODY MASS INDEX: 36.05 KG/M2 | DIASTOLIC BLOOD PRESSURE: 78 MMHG

## 2020-03-07 DIAGNOSIS — I10 ESSENTIAL HYPERTENSION: ICD-10-CM

## 2020-03-07 DIAGNOSIS — O03.9 COMPLETE ABORTION: ICD-10-CM

## 2020-03-07 DIAGNOSIS — O23.599 BACTERIAL VAGINOSIS IN PREGNANCY: ICD-10-CM

## 2020-03-07 DIAGNOSIS — N93.9 VAGINAL BLEEDING: ICD-10-CM

## 2020-03-07 DIAGNOSIS — O20.0 ABORTION, THREATENED: ICD-10-CM

## 2020-03-07 DIAGNOSIS — B96.89 BACTERIAL VAGINOSIS IN PREGNANCY: ICD-10-CM

## 2020-03-07 LAB
ANION GAP SERPL CALC-SCNC: 13 MMOL/L (ref 0–11.9)
APPEARANCE UR: ABNORMAL
B-HCG SERPL-ACNC: ABNORMAL MIU/ML (ref 0–5)
BACTERIA #/AREA URNS HPF: ABNORMAL /HPF
BACTERIA GENITAL QL WET PREP: NORMAL
BASOPHILS # BLD AUTO: 0.6 % (ref 0–1.8)
BASOPHILS # BLD: 0.06 K/UL (ref 0–0.12)
BILIRUB UR QL STRIP.AUTO: NEGATIVE
BUN SERPL-MCNC: 13 MG/DL (ref 8–22)
CALCIUM SERPL-MCNC: 9.7 MG/DL (ref 8.5–10.5)
CHLORIDE SERPL-SCNC: 100 MMOL/L (ref 96–112)
CO2 SERPL-SCNC: 22 MMOL/L (ref 20–33)
COLOR UR: YELLOW
CREAT SERPL-MCNC: 0.66 MG/DL (ref 0.5–1.4)
EOSINOPHIL # BLD AUTO: 0.24 K/UL (ref 0–0.51)
EOSINOPHIL NFR BLD: 2.6 % (ref 0–6.9)
EPI CELLS #/AREA URNS HPF: NEGATIVE /HPF
ERYTHROCYTE [DISTWIDTH] IN BLOOD BY AUTOMATED COUNT: 43.7 FL (ref 35.9–50)
GLUCOSE SERPL-MCNC: 256 MG/DL (ref 65–99)
GLUCOSE UR STRIP.AUTO-MCNC: >=1000 MG/DL
HCT VFR BLD AUTO: 39.5 % (ref 37–47)
HGB BLD-MCNC: 12.1 G/DL (ref 12–16)
HYALINE CASTS #/AREA URNS LPF: ABNORMAL /LPF
IMM GRANULOCYTES # BLD AUTO: 0.02 K/UL (ref 0–0.11)
IMM GRANULOCYTES NFR BLD AUTO: 0.2 % (ref 0–0.9)
KETONES UR STRIP.AUTO-MCNC: NEGATIVE MG/DL
LEUKOCYTE ESTERASE UR QL STRIP.AUTO: NEGATIVE
LYMPHOCYTES # BLD AUTO: 3.58 K/UL (ref 1–4.8)
LYMPHOCYTES NFR BLD: 38.7 % (ref 22–41)
MCH RBC QN AUTO: 22.8 PG (ref 27–33)
MCHC RBC AUTO-ENTMCNC: 30.6 G/DL (ref 33.6–35)
MCV RBC AUTO: 74.4 FL (ref 81.4–97.8)
MICRO URNS: ABNORMAL
MONOCYTES # BLD AUTO: 0.63 K/UL (ref 0–0.85)
MONOCYTES NFR BLD AUTO: 6.8 % (ref 0–13.4)
NEUTROPHILS # BLD AUTO: 4.73 K/UL (ref 2–7.15)
NEUTROPHILS NFR BLD: 51.1 % (ref 44–72)
NITRITE UR QL STRIP.AUTO: NEGATIVE
NRBC # BLD AUTO: 0 K/UL
NRBC BLD-RTO: 0 /100 WBC
NUMBER OF RH DOSES IND 8505RD: NORMAL
PH UR STRIP.AUTO: 5 [PH] (ref 5–8)
PLATELET # BLD AUTO: 285 K/UL (ref 164–446)
PMV BLD AUTO: 10.3 FL (ref 9–12.9)
POTASSIUM SERPL-SCNC: 3.3 MMOL/L (ref 3.6–5.5)
PROT UR QL STRIP: NEGATIVE MG/DL
RBC # BLD AUTO: 5.31 M/UL (ref 4.2–5.4)
RBC # URNS HPF: ABNORMAL /HPF
RBC UR QL AUTO: ABNORMAL
RH BLD: NORMAL
SIGNIFICANT IND 70042: NORMAL
SITE SITE: NORMAL
SODIUM SERPL-SCNC: 135 MMOL/L (ref 135–145)
SOURCE SOURCE: NORMAL
SP GR UR STRIP.AUTO: 1.01
UROBILINOGEN UR STRIP.AUTO-MCNC: 0.2 MG/DL
WBC # BLD AUTO: 9.3 K/UL (ref 4.8–10.8)
WBC #/AREA URNS HPF: ABNORMAL /HPF

## 2020-03-07 PROCEDURE — 700102 HCHG RX REV CODE 250 W/ 637 OVERRIDE(OP): Performed by: EMERGENCY MEDICINE

## 2020-03-07 PROCEDURE — 99284 EMERGENCY DEPT VISIT MOD MDM: CPT

## 2020-03-07 PROCEDURE — 76801 OB US < 14 WKS SINGLE FETUS: CPT

## 2020-03-07 PROCEDURE — 81001 URINALYSIS AUTO W/SCOPE: CPT

## 2020-03-07 PROCEDURE — A9270 NON-COVERED ITEM OR SERVICE: HCPCS | Performed by: EMERGENCY MEDICINE

## 2020-03-07 PROCEDURE — 84702 CHORIONIC GONADOTROPIN TEST: CPT

## 2020-03-07 PROCEDURE — 86901 BLOOD TYPING SEROLOGIC RH(D): CPT

## 2020-03-07 PROCEDURE — 85025 COMPLETE CBC W/AUTO DIFF WBC: CPT

## 2020-03-07 PROCEDURE — 80048 BASIC METABOLIC PNL TOTAL CA: CPT

## 2020-03-07 RX ORDER — METRONIDAZOLE 500 MG/1
500 TABLET ORAL 2 TIMES DAILY
Qty: 14 TAB | Refills: 0 | Status: SHIPPED | OUTPATIENT
Start: 2020-03-07 | End: 2020-03-14

## 2020-03-07 RX ORDER — ACETAMINOPHEN 500 MG
1000 TABLET ORAL ONCE
Status: COMPLETED | OUTPATIENT
Start: 2020-03-07 | End: 2020-03-07

## 2020-03-07 RX ADMIN — ACETAMINOPHEN 1000 MG: 500 TABLET ORAL at 02:40

## 2020-03-07 ASSESSMENT — FIBROSIS 4 INDEX
FIB4 SCORE: 0.68
FIB4 SCORE: 0.52

## 2020-03-07 NOTE — ED PROVIDER NOTES
"ED Provider Note    CHIEF COMPLAINT  Chief Complaint   Patient presents with   • Vaginal Bleeding     Pt states she took a pregnancy test 2 days ago and it was positive and today at 1100hrs she began to have vaginal bleeding. 4 children 2 previous misarriages.       HPI  Mary Santana is a 35 y.o. female who presents to the emergency department chief complaint of dark brown-black vaginal spotting with some cramping.  The patient is a  this would make her a G7.  She states she has not had her period since January and at that time when she had sex she did take the day after pill.  She usually follows up with the pregnancy center and was planning on going in on Monday and then she started having cramping today which is why she came in.  She denies any fevers or chills she does have a history of hypertension but has not been taking her medications in a long time she denies any leg swelling any dysuria or hematuria      REVIEW OF SYSTEMS  Positives as above. Pertinent negatives include nausea vomiting fevers chills dysuria hematuria flank pain diarrhea constipation  All other review of systems are negative    PAST MEDICAL HISTORY   has a past medical history of Cholelithiasis (2010) and Hypertension (2015).    SOCIAL HISTORY  Social History     Tobacco Use   • Smoking status: Never Smoker   • Smokeless tobacco: Never Used   Substance and Sexual Activity   • Alcohol use: No   • Drug use: No   • Sexual activity: Yes     Partners: Male     Comment: none       SURGICAL HISTORY   has a past surgical history that includes dilation and curettage (,).    CURRENT MEDICATIONS  Home Medications    **Home medications have not yet been reviewed for this encounter**         ALLERGIES  Allergies   Allergen Reactions   • Nkda [No Known Drug Allergy]        PHYSICAL EXAM  VITAL SIGNS: /90   Pulse 86   Temp 36.7 °C (98.1 °F) (Oral)   Resp 14   Ht 1.6 m (5' 3\")   Wt 92.3 kg (203 lb 7.8 oz)   LMP " 01/07/2020   SpO2 100%   BMI 36.05 kg/m²    Pulse ox interpretation: I interpret this pulse ox as normal.  Constitutional: Alert in no apparent distress.  HENT: Normocephalic atraumatic, MMM  Eyes: PER, Conjunctiva normal, Non-icteric.   Neck: Normal range of motion, No tenderness, Supple, No stridor.   Cardiovascular: Regular rate and rhythm, no murmurs.   Thorax & Lungs: Normal breath sounds, No respiratory distress, No wheezing, No chest tenderness.   Abdomen: Bowel sounds normal, Soft, No tenderness, No pulsatile masses. No peritoneal signs.  Skin: Warm, Dry, No erythema, No rash.   Back: No bony tenderness, No CVA tenderness.   Extremities/MSK: Intact distal pulses, No edema, No tenderness, No cyanosis, no major deformities noted  Neurologic: Alert and oriented x3, No focal deficits noted.       DIFFERENTIAL DIAGNOSIS AND WORK UP PLAN    This is a 35 y.o. female who presents with vaginal bleeding in the setting of her first trimester pregnancy, her examination is within normal limits she is nontender in the abdomen low concern at this time for ruptured ectopic pregnancy.  Will perform laboratory analysis pelvic exam and ultrasound and reassess    DIAGNOSTIC STUDIES / PROCEDURES    EKG      LABS  Pertinent Lab Findings  CBC within normal limits, BMP with a mild hypokalemia glucose of 256 with normal renal function hCG is appropriate elevated at 31,000 urinalysis without signs of infection Rh+ wet prep consistent with bacterial vaginosis      RADIOLOGY  US-OB 1ST TRIMESTER WITH TRANSVAGINAL (COMBO)   Final Result      1.  Single live intrauterine gestation with crown-rump length corresponding to an estimate of gestational age of 7 weeks 1 day for an RUSS of 10/23/2020.   2.  Low fetal heart rate at 80 bpm. OB consultation is recommended.        The radiologist's interpretation of all radiological studies have been reviewed by me.      COURSE & MEDICAL DECISION MAKING  Pertinent Labs & Imaging studies reviewed.  "(See chart for details)    2:39 AM  Pelvic exam performed    Pelvic and vaginal exam    External:  No lesions, normal labia, clitoris  Vaginal vault:  No lesions.  +mild thin white discharge, no bleeding. Old blood in vault  Cervix:  Closed, no active bleeding or CMT      3:11 AM  At this time I spoke with Dr. Hughes with gynecology in the pregnancy center, he recommends patient follow-up with their office on Monday with bradycardia in the fetus this could be secondary to just early pregnancy as they do start slow and then get faster.    I discussed with the patient the plan to follow-up on Monday with gynecology at the outpatient pregnancy center and return to the ED for any new or worsening cramping and severe bleeding, we discussed pelvic rest and no extreme activities.  She will be sent home on oral antibiotics and she understands feels comfortable going    /78   Pulse 83   Temp 36.9 °C (98.4 °F)   Resp 16   Ht 1.6 m (5' 3\")   Wt 92.3 kg (203 lb 7.8 oz)   LMP 2020   SpO2 99%   BMI 36.05 kg/m²       The patient will return for new or worsening symptoms and is stable at the time of discharge.    The patient is referred to a primary physician for blood pressure management, diabetic screening, and for all other preventative health concerns.    DISPOSITION:  Patient will be discharged home in stable condition.    FOLLOW UP:  [facility], Emergency Dept  [address] [facility] [address] [phone]    If symptoms worsen - worsening bleeding and pain    Jaden Hughes M.D.  [address] [place] [address] [id number]  [id number]  [facility] [address] [phone]    Call on 3/9/2020  for the gynecology urgent care      OUTPATIENT MEDICATIONS:  Discharge Medication List as of 3/7/2020  3:15 AM      START taking these medications    Details   metroNIDAZOLE (FLAGYL) 500 MG Tab Take 1 Tab by mouth 2 Times a Day for 7 days., Disp-14 Tab, R-0, Normal             FINAL IMPRESSION  1. , threatened   "   2. Bacterial vaginosis in pregnancy     3. Essential hypertension             Electronically signed by: Breanna Ho M.D., 3/7/2020 1:23 AM    This dictation has been created using voice recognition software and/or scribes. The accuracy of the dictation is limited by the abilities of the software and the expertise of the scribes. I expect there may be some errors of grammar and possibly content. I made every attempt to manually correct the errors within my dictation. However, errors related to voice recognition software and/or scribes may still exist and should be interpreted within the appropriate context.

## 2020-03-07 NOTE — ED TRIAGE NOTES
"Mary Santana Santana  Chief Complaint   Patient presents with   • Vaginal Bleeding     Pt states she took a pregnancy test 2 days ago and it was positive and today at 1100hrs she began to have vaginal bleeding..     Pt Ambulatory to triage with above complaint.     /90   Pulse 86   Temp 36.7 °C (98.1 °F) (Oral)   Resp 14   Ht 1.6 m (5' 3\")   Wt 92.3 kg (203 lb 7.8 oz)   LMP 2020   SpO2 100%   BMI 36.05 kg/m²     Pt informed of triage process and encouraged to notify staff of any changes or concerns. Pt verbalized understanding of instructions. Apologized for long wait time. Pt placed back in lobby.     "

## 2020-03-08 ENCOUNTER — HOSPITAL ENCOUNTER (EMERGENCY)
Facility: MEDICAL CENTER | Age: 35
End: 2020-03-08
Attending: EMERGENCY MEDICINE

## 2020-03-08 VITALS
DIASTOLIC BLOOD PRESSURE: 46 MMHG | HEIGHT: 63 IN | TEMPERATURE: 97.7 F | WEIGHT: 196.21 LBS | BODY MASS INDEX: 34.77 KG/M2 | HEART RATE: 100 BPM | OXYGEN SATURATION: 95 % | RESPIRATION RATE: 16 BRPM | SYSTOLIC BLOOD PRESSURE: 121 MMHG

## 2020-03-08 DIAGNOSIS — N93.9 VAGINAL BLEEDING: ICD-10-CM

## 2020-03-08 DIAGNOSIS — O03.9 SPONTANEOUS ABORTION: ICD-10-CM

## 2020-03-08 LAB
B-HCG SERPL-ACNC: ABNORMAL MIU/ML (ref 0–5)
ERYTHROCYTE [DISTWIDTH] IN BLOOD BY AUTOMATED COUNT: 45.1 FL (ref 35.9–50)
ERYTHROCYTE [DISTWIDTH] IN BLOOD BY AUTOMATED COUNT: 45.5 FL (ref 35.9–50)
HCT VFR BLD AUTO: 29.5 % (ref 37–47)
HCT VFR BLD AUTO: 39.6 % (ref 37–47)
HGB BLD-MCNC: 11.7 G/DL (ref 12–16)
HGB BLD-MCNC: 9.1 G/DL (ref 12–16)
MCH RBC QN AUTO: 22.3 PG (ref 27–33)
MCH RBC QN AUTO: 23.3 PG (ref 27–33)
MCHC RBC AUTO-ENTMCNC: 29.5 G/DL (ref 33.6–35)
MCHC RBC AUTO-ENTMCNC: 30.8 G/DL (ref 33.6–35)
MCV RBC AUTO: 75.4 FL (ref 81.4–97.8)
MCV RBC AUTO: 75.4 FL (ref 81.4–97.8)
PLATELET # BLD AUTO: 245 K/UL (ref 164–446)
PLATELET # BLD AUTO: 317 K/UL (ref 164–446)
PMV BLD AUTO: 10.7 FL (ref 9–12.9)
PMV BLD AUTO: 10.8 FL (ref 9–12.9)
RBC # BLD AUTO: 3.91 M/UL (ref 4.2–5.4)
RBC # BLD AUTO: 5.25 M/UL (ref 4.2–5.4)
WBC # BLD AUTO: 10.6 K/UL (ref 4.8–10.8)
WBC # BLD AUTO: 11 K/UL (ref 4.8–10.8)

## 2020-03-08 PROCEDURE — 96375 TX/PRO/DX INJ NEW DRUG ADDON: CPT

## 2020-03-08 PROCEDURE — 84702 CHORIONIC GONADOTROPIN TEST: CPT

## 2020-03-08 PROCEDURE — 700105 HCHG RX REV CODE 258: Performed by: EMERGENCY MEDICINE

## 2020-03-08 PROCEDURE — 85027 COMPLETE CBC AUTOMATED: CPT | Mod: 91

## 2020-03-08 PROCEDURE — 99285 EMERGENCY DEPT VISIT HI MDM: CPT

## 2020-03-08 PROCEDURE — 94760 N-INVAS EAR/PLS OXIMETRY 1: CPT

## 2020-03-08 PROCEDURE — 88305 TISSUE EXAM BY PATHOLOGIST: CPT

## 2020-03-08 PROCEDURE — 96374 THER/PROPH/DIAG INJ IV PUSH: CPT

## 2020-03-08 PROCEDURE — 700111 HCHG RX REV CODE 636 W/ 250 OVERRIDE (IP): Performed by: EMERGENCY MEDICINE

## 2020-03-08 PROCEDURE — 88233 TISSUE CULTURE SKIN/BIOPSY: CPT

## 2020-03-08 RX ORDER — KETOROLAC TROMETHAMINE 30 MG/ML
30 INJECTION, SOLUTION INTRAMUSCULAR; INTRAVENOUS ONCE
Status: COMPLETED | OUTPATIENT
Start: 2020-03-08 | End: 2020-03-08

## 2020-03-08 RX ORDER — SODIUM CHLORIDE 9 MG/ML
1000 INJECTION, SOLUTION INTRAVENOUS ONCE
Status: COMPLETED | OUTPATIENT
Start: 2020-03-08 | End: 2020-03-08

## 2020-03-08 RX ORDER — ONDANSETRON 2 MG/ML
4 INJECTION INTRAMUSCULAR; INTRAVENOUS ONCE
Status: COMPLETED | OUTPATIENT
Start: 2020-03-08 | End: 2020-03-08

## 2020-03-08 RX ADMIN — SODIUM CHLORIDE 1000 ML: 9 INJECTION, SOLUTION INTRAVENOUS at 05:57

## 2020-03-08 RX ADMIN — SODIUM CHLORIDE 1000 ML: 9 INJECTION, SOLUTION INTRAVENOUS at 07:38

## 2020-03-08 RX ADMIN — KETOROLAC TROMETHAMINE 30 MG: 30 INJECTION, SOLUTION INTRAMUSCULAR; INTRAVENOUS at 06:15

## 2020-03-08 RX ADMIN — FENTANYL CITRATE 50 MCG: 0.05 INJECTION, SOLUTION INTRAMUSCULAR; INTRAVENOUS at 07:48

## 2020-03-08 RX ADMIN — ONDANSETRON 4 MG: 2 INJECTION INTRAMUSCULAR; INTRAVENOUS at 07:38

## 2020-03-08 ASSESSMENT — FIBROSIS 4 INDEX: FIB4 SCORE: 0.52

## 2020-03-08 NOTE — DISCHARGE INSTRUCTIONS
Follow-up with the pregnancy center tomorrow for reevaluation.    Motrin/Advil/ibuprofen 600 mg every 6 hours as needed for abdominal cramping or vaginal bleeding.    Activity as tolerated, although limited activity recommended.  No intercourse or other intravaginal objects until seen by gynecology.    Return to the emergency department for persistent worsening abdominal pain, vaginal bleeding (1 pad per hour for more than 4 hours), syncope, fever, vomiting or other new concerns.    Encourage oral fluid hydration.  Diet as tolerated.

## 2020-03-08 NOTE — ED NOTES
PT with IVF running as ordered and PT with BP of 70/30 after pelvic exam.  Pt with family at bedside and ERP informed of low BP.

## 2020-03-08 NOTE — ED PROVIDER NOTES
ED Provider Note     Scribed for Miroslava Dooley D.O. by Narayan Nixon. 3/7/2020, 10:54 PM.     Primary care provider: Pcp Pt States None  Means of arrival: Walk in         History obtained from: patient  History limited by: none    CHIEF COMPLAINT  Chief Complaint   Patient presents with   • Vaginal Bleeding     Occured when using restoom, described as a formed blood clot/ sack, occured otday   • Pregnancy   • Lower GI Bleed     Lower abdominal pain since 3/6/20       HPI  Mary Santana is a 35 y.o. female who presents to the emergency Department with her  and son for continued vaginal bleeding with associated lower abdominal cramping. The patient was in the ED yesterday for vaginal bleeding and was told by the physician she is 7 weeks pregnant. Patient is back in the ED today for persistent and worsening vaginal bleeding, lower abdominal cramping and back pain.  The patient notes she has passed three large clumps of blood clots and has saturated three maxi pads since. No exacerbating or alleviating factors were reported. She denies any fever, nausea, or back pain. She also denies any known drug allergies.  She has had 2 previous miscarriages.    REVIEW OF SYSTEMS  Pertinent positives include vaginal bleeding and lower abdominal cramping. Pertinent negatives include no fever, nausea, back pain.   See HPI for further details. All other systems are negative.    PAST MEDICAL HISTORY  Past Medical History:   Diagnosis Date   • Cholelithiasis 11/5/2010   • Hypertension 06/2015    Pt states was taking Lisinopril 10 mg but stopped taking on her own more than a year ago.        FAMILY HISTORY  Family History   Problem Relation Age of Onset   • Alcohol/Drug Father         alcohol       SOCIAL HISTORY  Social History     Tobacco Use   • Smoking status: Never Smoker   • Smokeless tobacco: Never Used   Substance Use Topics   • Alcohol use: No   • Drug use: No      Social History     Substance and Sexual Activity    Drug Use No       SURGICAL HISTORY  Past Surgical History:   Procedure Laterality Date   • DILATION AND CURETTAGE  2003,2007    due to SAB       CURRENT MEDICATIONS  No current facility-administered medications for this encounter.     Current Outpatient Medications:   •  metroNIDAZOLE (FLAGYL) 500 MG Tab, Take 1 Tab by mouth 2 Times a Day for 7 days., Disp: 14 Tab, Rfl: 0  •  docusate sodium 100 MG Cap, Take 100 mg by mouth 2 times a day as needed for Constipation., Disp: 60 Cap, Rfl: 1  •  ibuprofen (MOTRIN) 600 MG Tab, Take 1 Tab by mouth every 6 hours as needed (For cramping after delivery; do not give if patient is receiving ketorolac (Toradol))., Disp: 30 Tab, Rfl: 0  •  prenatal plus vitamin (STUARTNATAL 1+1) 27-1 MG Tab tablet, Take 1 Tab by mouth every morning., Disp: 60 Tab, Rfl: 1  •  ferrous sulfate 325 (65 Fe) MG EC tablet, Take 1 Tab by mouth 3 times a day, with meals., Disp: 120 Tab, Rfl: 1    ALLERGIES  Allergies   Allergen Reactions   • Nkda [No Known Drug Allergy]        PHYSICAL EXAM  VITAL SIGNS: /82   Pulse 75   Temp 36.4 °C (97.5 °F) (Temporal)   Resp 18   Wt 90.9 kg (200 lb 6.4 oz)   SpO2 97%   BMI 35.50 kg/m²     Constitutional: Patient is well developed, well nourished.  Mild distress from her abdominal cramping.  HENT: Normocephalic, oral mucosa is moist, nose normal with no drainage.  Eyes: PERRL, EOMI, Conjunctiva without erythema.  Neck: Supple, Normal range of motion in flexion, extension and lateral rotation.   Lymphatic: No lymphadenopathy noted.   Cardiovascular: Normal heart rate and Regular rhythm. No murmur  Thorax & Lungs: Clear and equal breath sounds with good excursion. No respiratory distress.  Abdomen: Bowel sounds normal in all four quadrants. Soft with some lower abdominal cramping , no rebound, guarding or flank tenderness.  Skin: Warm, Dry  Genitalia: Marked amount of bright red blood in the vaginal vault that was cleared with 4 4by 4's.  Products of  conception were noted within the vaginal vault.  Neurologic: Alert & oriented x 3, Normal motor function, Normal sensory function,  Psychiatric: Affect normal, Judgment normal, Mood normal.     COURSE & MEDICAL DECISION MAKING  Pertinent Labs & Imaging studies reviewed. (See chart for details)    10:54 PM - Patient seen and evaluated at bedside. A pelvic exam was performed and the products of conception were removed with ring forceps.  The patient had immediate relief of her lower abdominal cramping her bleeding has markedly improved.  The pain has resolved.  Patient is instructed to not use any tampons, no intercourse until the bleeding has stopped, she should have a normal menstrual cycle now.  She is to take ibuprofen as needed for pain and return if elevated fever, increase or worsening bleeding or uncontrolled pain.  The plan for discharge was discussed with the patient. Patient was given return precautions and welcomed to return to the ED. Patient understood and verbalized agreement.    The patient is referred to a primary physician for blood pressure management, diabetic screening, and for all other preventative health concerns      DISPOSITION:  Patient will be discharged home in stable condition.    FOLLOW UP:  [address] [address] [phone]  Schedule an appointment as soon as possible for a visit in 3 days  For recheck      FINAL IMPRESSION  1. Complete     2. Vaginal bleeding         Narayan LINDSEY (Scribe), am scribing for, and in the presence of, Miroslava Dooley D.O..    Electronically signed by: Narayan Nixon (Jyoti), 3/7/2020    Miroslava LINDSEY D.O. personally performed the services described in this documentation, as scribed by Narayan Nixon in my presence, and it is both accurate and complete. E    The note accurately reflects work and decisions made by me.  Miroslava Dooley D.O.  3/8/2020  1:24 AM

## 2020-03-08 NOTE — ED NOTES
Pt reports pain has decreased, currently 5/10. Moderated amount of vaginal bleeding noted at this time, pad changed.

## 2020-03-08 NOTE — DISCHARGE INSTRUCTIONS
Follow-up with your primary care provider within the week for recheck  Return if fever greater than 101, uncontrolled bleeding i.e. 1 maxi pad per hour, uncontrolled abdominal pain  Otherwise you should have a normal menstrual cycle with heavy bleeding tapering each day.  Ibuprofen for pain.

## 2020-03-08 NOTE — ED NOTES
Assumed care of pt. White board updated in room. Pt reporting severe lower quadrant abd pain. Pt BP in mid 80's. Pt Alert and Oriented, reporting dizziness. ERP notified orders received.

## 2020-03-08 NOTE — ED TRIAGE NOTES
Chief Complaint   Patient presents with   • Vaginal Bleeding     Occured when using restoom, described as a formed blood clot/ sack, occured otday   • Pregnancy   • Lower GI Bleed     Lower abdominal pain since 3/6/20

## 2020-03-08 NOTE — ED NOTES
Pt vu dcis with strict return precautions and follow up with pregnancy center tomorrow. Pt and family VU dcis. Pt left A&Ox4 via wheelchair with family to home. All discharge instructions communicated video .

## 2020-03-08 NOTE — ED PROVIDER NOTES
ED Provider Note    CHIEF COMPLAINT  Chief Complaint   Patient presents with   • Vaginal Bleeding       HPI  Mary Santana is a 35 y.o. female who presents to the emergency department through triage with her  for vaginal bleeding.  This is patient's third visit in less than 36 hours for vaginal bleeding and low abdominal cramping.  Initially IUP noted with bradycardia.  Threatened miscarriage instructions provided.  Patient was seen late last night with concern of persistent bleeding, no from that evaluation indicates passage of POC and clots, also involved cleared, presumed completed .  Patient states that she was doing well until midnight when the bleeding began again.  She is used her youngest child's diaper to collect the blood.  She is now feeling slightly lightheaded.  No chest pain, shortness of breath or syncope.  No fever.    G7, P4 TAB 2.  Last delivery was 2018, all vaginal deliveries.    REVIEW OF SYSTEMS  See HPI for further details. All other systems are negative.     PAST MEDICAL HISTORY   has a past medical history of Cholelithiasis (2010) and Hypertension (2015).    SOCIAL HISTORY  Social History     Tobacco Use   • Smoking status: Never Smoker   • Smokeless tobacco: Never Used   Substance and Sexual Activity   • Alcohol use: No   • Drug use: No   • Sexual activity: Yes     Partners: Male     Comment: none       SURGICAL HISTORY   has a past surgical history that includes dilation and curettage (,).    CURRENT MEDICATIONS  Home Medications     Reviewed by Андрей Cool R.N. (Registered Nurse) on 20 at 0533  Med List Status: <None>   Medication Last Dose Status   docusate sodium 100 MG Cap  Active   ferrous sulfate 325 (65 Fe) MG EC tablet  Active   ibuprofen (MOTRIN) 600 MG Tab  Active   metroNIDAZOLE (FLAGYL) 500 MG Tab  Active   prenatal plus vitamin (STUARTNATAL 1+1) 27-1 MG Tab tablet  Active                ALLERGIES  Allergies   Allergen  "Reactions   • Nkda [No Known Drug Allergy]        PHYSICAL EXAM  VITAL SIGNS: /58   Pulse 73   Temp 36.2 °C (97.1 °F) (Temporal)   Resp 16   Ht 1.6 m (5' 3\")   Wt 89 kg (196 lb 3.4 oz)   SpO2 97%   BMI 34.76 kg/m²   Pulse ox interpretation: I interpret this pulse ox as normal.  Constitutional: Alert in no apparent distress.  HENT: Normocephalic, atraumatic. Bilateral external ears normal, Nose normal.  Dry mucous membranes.    Eyes: Pupils are equal and reactive, Conjunctiva normal.   Neck: Normal range of motion, Supple  Lymphatic: No lymphadenopathy noted.   Cardiovascular: Tachycardic otherwise regular rate and rhythm, no murmurs. Distal pulses intact.    Thorax & Lungs: Normal breath sounds.  No wheezing/rales/ronchi. No increased work of breathing  Abdomen: Obese, soft, nondistended.  Tender to palpation across the low abdomen without rebound, guarding or peritonitis.  : Speculum exam with dark blood and clots in vault.  Some clot in office.  Some evidence for remaining tissue.  Office open.  Vault was cleared with use of suction and ring forceps.  Skin: Warm, Dry, No erythema, No rash.   Musculoskeletal: Good range of motion in all major joints.    Neurologic: Alert and oriented x4.  Psychiatric: Affect normal, Judgment normal, Mood normal.       DIAGNOSTIC STUDIES / PROCEDURES    LABS  Results for orders placed or performed during the hospital encounter of 03/08/20   CBC WITHOUT DIFFERENTIAL   Result Value Ref Range    WBC 10.6 4.8 - 10.8 K/uL    RBC 5.25 4.20 - 5.40 M/uL    Hemoglobin 11.7 (L) 12.0 - 16.0 g/dL    Hematocrit 39.6 37.0 - 47.0 %    MCV 75.4 (L) 81.4 - 97.8 fL    MCH 22.3 (L) 27.0 - 33.0 pg    MCHC 29.5 (L) 33.6 - 35.0 g/dL    RDW 45.1 35.9 - 50.0 fL    Platelet Count 317 164 - 446 K/uL    MPV 10.8 9.0 - 12.9 fL   HCG QUANTITATIVE SERUM   Result Value Ref Range    Bhcg 47776.3 (H) 0.0 - 5.0 mIU/mL   CBC WITHOUT DIFFERENTIAL   Result Value Ref Range    WBC 11.0 (H) 4.8 - 10.8 K/uL "    RBC 3.91 (L) 4.20 - 5.40 M/uL    Hemoglobin 9.1 (L) 12.0 - 16.0 g/dL    Hematocrit 29.5 (L) 37.0 - 47.0 %    MCV 75.4 (L) 81.4 - 97.8 fL    MCH 23.3 (L) 27.0 - 33.0 pg    MCHC 30.8 (L) 33.6 - 35.0 g/dL    RDW 45.5 35.9 - 50.0 fL    Platelet Count 245 164 - 446 K/uL    MPV 10.7 9.0 - 12.9 fL       COURSE & MEDICAL DECISION MAKING  Medical record review: Refer to HPI.  Beta quant 3/7 was [id number], Rh+    ED evaluation today most consistent with incomplete spontaneous  as patient continues to bleed.  There is documented passage of products of conception late last night.  Additional clots and tissue today.  Beta quant is downtrending.  Hemoglobin is stable, down 0.4 mg/dL.  Hemodynamically stable, tachycardia improved after IV fluid bolus for blood loss and dehydration.  Soft blood pressure has improved as well.    Repeat speculum exam with reaccumulation of large volume clots and dark blood in the vault.  Worked again to clear this with suction and ring forceps.  On last evaluation there was no further clot or blood in the vault, no active bleeding from the office.  Abdominal exam, minimally tender but otherwise benign and improved from onset.  Pain controlled with Toradol and fentanyl.  Will repeat H&H before disposition.    10:50 AM repeat hemoglobin 9.1.  This is down 2.6 points from earlier this morning, 3 points from previous evaluation.  However, patient did get 2 L of IV fluid as well, there may be some dilutional aspect to this.  Additionally, another speculum exam without any recurrent pooling of blood or clots in the vault.  Patient is feeling much better.  Abdominal exam is benign.  Pain controlled.  Hemodynamically stable without tachycardia or hypotension.  I suspect she really has reached near the end of this miscarriage.  She ambulates independently.  No near syncope or orthostasis.    Patient is stable for discharge at this time, anticipatory guidance provided, Motrin for discomfort, close  follow-up is encouraged with pregnancy center as initially planned tomorrow, and strict ED return instructions have been detailed. Patient and her  are agreeable to the disposition and plan.    Evaluation completed with Yakut speaking son per patient's request as needed    FINAL IMPRESSION  (N93.9) Vaginal bleeding  (O03.9) Spontaneous       Electronically signed by: Breanna Comer D.O., 3/8/2020 9:25 AM      This dictation was created using voice recognition software. The accuracy of the dictation is limited to the abilities of the software. I expect there may be some errors of grammar and possibly content. The nursing notes were reviewed and certain aspects of this information were incorporated into this note.

## 2020-03-08 NOTE — ED NOTES
This RN at bedside to discharge pt. Pt reporting feeling dizzy and near syncope. Pt instructed to lay back on bed VS obtained and stable. Pt symptoms resolved without intervention.

## 2020-03-08 NOTE — ED NOTES
D/C home with written and verbal instructions re: Rx, activity, f/u.  Verbalizes understanding.  Ambulated out with steady gait.

## 2020-03-08 NOTE — ED TRIAGE NOTES
.  Chief Complaint   Patient presents with   • Vaginal Bleeding      Pt ambulate to triage with above complaint. Pt seen here yesterday for same S/S, Pt had complete  yesterday with significant bleeding. Pt now C/O increased bleeding with several large blood clots. Pt reports having to use a diaper due to the increased bleeding. Reports pain is 10/10 in lower abdomen and lower back. Pt notes she is more dizzy and light headed today.   Charge RN notified, Pt taken to room.

## 2020-03-08 NOTE — ED NOTES
PT medicated per mar. Pt reporting dizziness and nausea after fentanyl. Provided emesis bag. Pad changed 1/4 soaked, 1 clot noted. Pericare performed. Pt nausea and dizziness resolved without intervention. Provided blanket. Pt and family at bedside updated on plan of care.

## 2020-03-12 ENCOUNTER — GYNECOLOGY VISIT (OUTPATIENT)
Dept: OBGYN | Facility: CLINIC | Age: 35
End: 2020-03-12

## 2020-03-12 VITALS — WEIGHT: 196.6 LBS | DIASTOLIC BLOOD PRESSURE: 88 MMHG | SYSTOLIC BLOOD PRESSURE: 130 MMHG | BODY MASS INDEX: 34.83 KG/M2

## 2020-03-12 DIAGNOSIS — O03.9 MISCARRIAGE: ICD-10-CM

## 2020-03-12 PROCEDURE — 99213 OFFICE O/P EST LOW 20 MIN: CPT | Performed by: OBSTETRICS & GYNECOLOGY

## 2020-03-12 RX ORDER — MISOPROSTOL 200 UG/1
200 TABLET ORAL ONCE
Status: CANCELLED | OUTPATIENT
Start: 2020-03-12 | End: 2020-03-12

## 2020-03-12 ASSESSMENT — FIBROSIS 4 INDEX: FIB4 SCORE: .6060915267313264494

## 2020-03-12 NOTE — PROGRESS NOTES
Chief Complaint   Patient presents with   • Gynecologic Exam   Chief complaint: Follow-up miscarriage      History of present illness: 35 y.o.  7 para 4-0-3-4 presents to office for follow-up after ER visit.  Patient was seen in the emergency room on 2020 secondary to vaginal bleeding, that time intrauterine pregnancy with a very slow heartbeat was noted at 80 bpm..  Patient returned the following day and was told that she had a miscarriage.  Patient having began to bleed heavily and passed pros of conception early this week.  She continues have some bleeding right now, reports is much lighter, like a cycle per her report.  Some pain, but less than she experienced over the weekend.    Last menstrual period 2020.  Took Plan B on 2020.    Review of systems:  Pertinent positives documented in HPI and a comprehensive review of system is negative    All PMH, PSH, allergies, social history and FH reviewed and updated today:  Past Medical History:   Diagnosis Date   • Cholelithiasis 2010   • Hypertension 2015    Pt states was taking Lisinopril 10 mg but stopped taking on her own more than a year ago.        Past Surgical History:   Procedure Laterality Date   • DILATION AND CURETTAGE  ,    due to SAB       Allergies:   Allergies   Allergen Reactions   • Nkda [No Known Drug Allergy]        Social History     Socioeconomic History   • Marital status: Single     Spouse name: Not on file   • Number of children: Not on file   • Years of education: Not on file   • Highest education level: Not on file   Occupational History   • Not on file   Social Needs   • Financial resource strain: Not on file   • Food insecurity     Worry: Not on file     Inability: Not on file   • Transportation needs     Medical: Not on file     Non-medical: Not on file   Tobacco Use   • Smoking status: Never Smoker   • Smokeless tobacco: Never Used   Substance and Sexual Activity   • Alcohol use: No    • Drug use: No   • Sexual activity: Yes     Partners: Male     Birth control/protection: Condom     Comment: Pt states took plan B on 1/21/2020   Lifestyle   • Physical activity     Days per week: Not on file     Minutes per session: Not on file   • Stress: Not on file   Relationships   • Social connections     Talks on phone: Not on file     Gets together: Not on file     Attends Church service: Not on file     Active member of club or organization: Not on file     Attends meetings of clubs or organizations: Not on file     Relationship status: Not on file   • Intimate partner violence     Fear of current or ex partner: Not on file     Emotionally abused: Not on file     Physically abused: Not on file     Forced sexual activity: Not on file   Other Topics Concern   • Not on file   Social History Narrative   • Not on file       Family History   Problem Relation Age of Onset   • Alcohol/Drug Father         alcohol       Physical exam:  /88   Wt 89.2 kg (196 lb 9.6 oz)     GENERAL APPEARANCE: healthy, alert, no distress  NECK nontender, no masses, thyromegaly or nodules  ABDOMEN Abdomen soft, non-tender. BS normal. No masses,  No organomegaly  FEMALE GYN: normal female external genitalia without lesions, BUS normal, no vaginal discharge noted, but small amount of blood is noted at the perineum, vulva pink without erythema or friability, urethra is normal without discharge or scarring, no bladder fullness or masses, normal external genitalia, no erythema, no discharge, normal vagina and normal vaginal tone, normal cervix, normal uterus, size and consistency, normal adnexa without tenderness, normal anus and perineum.  NEURO Awake, alert and oriented x 3, Normal gait, no sensory deficits  SKIN No rashes, or ulcers or lesions seen  PSYCHIATRIC: Patient shows appropriate affect, is alert and oriented x3, intact judgment and insight.      Transvaginal ultrasound performed myself shows small amount of proximal  conception noted in the lower uterine segment and cervix.  Small amount of blood also noted within the cavity.  Bilateral normal ovaries.  No pelvic free fluid noted.    Assessment:  1. Miscarriage         Plan:    Discussed with patient findings on ultrasound.  Small amount of proximal conception still noted within the uterus.  Discussed with patient management with Cytotec or D&C.  Expectant management was also discussed with patient.    At this time, patient received medical management with Cytotec.  Side effects of Cytotec discussed with patient, mainly risk of fever, abdominal pain, nausea, vomiting, chills discussed with patient.  If no improvement in bleeding pattern after use of Cytotec, may need D&C.    Follow-up 1 week.    Patient is Rh+.    We will also need hCG levels followed until negative.  We will give order for hCG levels next visit    Questions answered    Spent  15 minutes in face-to-face patient contact in which greater than 50% of that visit was spent in counseling/coordination of care including medical and surgical options of care.

## 2020-03-12 NOTE — PROGRESS NOTES
Pt here for Gyn exam for ER follow up for SAB  # [phone]  Pt was seen at [facility] ER on 3/6/2020 was told to be on bed rest due to baby's heart rate was low , on 3/7/2020 pt was told had miscarriage and was told everything was taken out, pt had to return on 3/8/2020 because of hemorrhaging.   Pt states having some spotting.   LMP 1/7/2020  RUSS 10/13/2020  GA 9w2d  Cytotec was ordered as sample but Doctor didn't sign off, had to remove to be able to close chart

## 2020-03-24 LAB — PATHOLOGY CONSULT NOTE: NORMAL

## 2020-03-24 PROCEDURE — 88305 TISSUE EXAM BY PATHOLOGIST: CPT

## 2023-11-09 NOTE — ED NOTES
Assumed care of pt. Pt AAOx4. VSS. No signs of distress. Side rails up.     
Pt ambulatory to room 58. Pt is Uzbek speaking. Pt changing into gown.   
No
